# Patient Record
Sex: FEMALE | NOT HISPANIC OR LATINO | URBAN - METROPOLITAN AREA
[De-identification: names, ages, dates, MRNs, and addresses within clinical notes are randomized per-mention and may not be internally consistent; named-entity substitution may affect disease eponyms.]

---

## 2020-06-03 ENCOUNTER — TELEPHONE (OUTPATIENT)
Dept: GASTROENTEROLOGY | Facility: CLINIC | Age: 60
End: 2020-06-03

## 2020-06-03 NOTE — TELEPHONE ENCOUNTER
Veronica with instructions for covid test and for her to be there at [address] At 1:15 Sunday 6/7/2020 and for her to call back to let us know she knows.

## 2020-06-05 RX ORDER — SODIUM, POTASSIUM,MAG SULFATES 17.5-3.13G
2 SOLUTION, RECONSTITUTED, ORAL ORAL TAKE AS DIRECTED
Qty: 354 ML | Refills: 0 | Status: SHIPPED | OUTPATIENT
Start: 2020-06-05

## 2020-06-07 ENCOUNTER — APPOINTMENT (OUTPATIENT)
Dept: PREADMISSION TESTING | Facility: HOSPITAL | Age: 60
End: 2020-06-07

## 2020-06-07 PROCEDURE — U0002 COVID-19 LAB TEST NON-CDC: HCPCS

## 2020-06-07 PROCEDURE — U0004 COV-19 TEST NON-CDC HGH THRU: HCPCS

## 2020-06-07 PROCEDURE — C9803 HOPD COVID-19 SPEC COLLECT: HCPCS

## 2020-06-08 LAB
REF LAB TEST METHOD: NORMAL
SARS-COV-2 RNA RESP QL NAA+PROBE: NOT DETECTED

## 2020-06-09 ENCOUNTER — OUTSIDE FACILITY SERVICE (OUTPATIENT)
Dept: GASTROENTEROLOGY | Facility: CLINIC | Age: 60
End: 2020-06-09

## 2020-06-09 ENCOUNTER — LAB REQUISITION (OUTPATIENT)
Dept: LAB | Facility: HOSPITAL | Age: 60
End: 2020-06-09

## 2020-06-09 DIAGNOSIS — R13.10 DYSPHAGIA, UNSPECIFIED: ICD-10-CM

## 2020-06-09 DIAGNOSIS — Z12.11 ENCOUNTER FOR SCREENING FOR MALIGNANT NEOPLASM OF COLON: ICD-10-CM

## 2020-06-09 PROCEDURE — 88305 TISSUE EXAM BY PATHOLOGIST: CPT | Performed by: INTERNAL MEDICINE

## 2020-06-09 PROCEDURE — 43239 EGD BIOPSY SINGLE/MULTIPLE: CPT | Performed by: INTERNAL MEDICINE

## 2020-06-09 PROCEDURE — 99203 OFFICE O/P NEW LOW 30 MIN: CPT | Performed by: INTERNAL MEDICINE

## 2020-06-09 PROCEDURE — 45385 COLONOSCOPY W/LESION REMOVAL: CPT | Performed by: INTERNAL MEDICINE

## 2020-06-10 LAB
CYTO UR: NORMAL
LAB AP CASE REPORT: NORMAL
LAB AP CLINICAL INFORMATION: NORMAL
LAB AP DIAGNOSIS COMMENT: NORMAL
PATH REPORT.FINAL DX SPEC: NORMAL
PATH REPORT.GROSS SPEC: NORMAL

## 2023-05-11 ENCOUNTER — HOSPITAL ENCOUNTER (EMERGENCY)
Facility: HOSPITAL | Age: 63
Discharge: HOME | End: 2023-05-11
Attending: EMERGENCY MEDICINE
Payer: MEDICARE

## 2023-05-11 ENCOUNTER — APPOINTMENT (OUTPATIENT)
Dept: GENERAL RADIOLOGY | Facility: HOSPITAL | Age: 63
End: 2023-05-11
Payer: MEDICARE

## 2023-05-11 VITALS
DIASTOLIC BLOOD PRESSURE: 85 MMHG | HEART RATE: 97 BPM | OXYGEN SATURATION: 96 % | HEIGHT: 67 IN | TEMPERATURE: 98.6 F | WEIGHT: 222 LBS | SYSTOLIC BLOOD PRESSURE: 172 MMHG | BODY MASS INDEX: 34.84 KG/M2 | RESPIRATION RATE: 20 BRPM

## 2023-05-11 DIAGNOSIS — M25.512 ACUTE PAIN OF LEFT SHOULDER: Primary | ICD-10-CM

## 2023-05-11 DIAGNOSIS — R91.8 INFILTRATE OF LEFT LUNG PRESENT ON CHEST X-RAY: ICD-10-CM

## 2023-05-11 LAB
ALBUMIN SERPL-MCNC: 3.7 G/DL (ref 3.5–5.2)
ALBUMIN/GLOB SERPL: 1.2 G/DL
ALP SERPL-CCNC: 125 U/L (ref 39–117)
ALT SERPL W P-5'-P-CCNC: 17 U/L (ref 1–33)
ANION GAP SERPL CALCULATED.3IONS-SCNC: 10 MMOL/L (ref 5–15)
AST SERPL-CCNC: 20 U/L (ref 1–32)
BASOPHILS # BLD AUTO: 0.09 10*3/MM3 (ref 0–0.2)
BASOPHILS NFR BLD AUTO: 0.7 % (ref 0–1.5)
BILIRUB SERPL-MCNC: 0.3 MG/DL (ref 0–1.2)
BUN SERPL-MCNC: 18 MG/DL (ref 8–23)
BUN/CREAT SERPL: 23.7 (ref 7–25)
CALCIUM SPEC-SCNC: 9.4 MG/DL (ref 8.6–10.5)
CHLORIDE SERPL-SCNC: 101 MMOL/L (ref 98–107)
CO2 SERPL-SCNC: 27 MMOL/L (ref 22–29)
CREAT SERPL-MCNC: 0.76 MG/DL (ref 0.57–1)
DEPRECATED RDW RBC AUTO: 44.2 FL (ref 37–54)
EGFRCR SERPLBLD CKD-EPI 2021: 87.6 ML/MIN/1.73
EOSINOPHIL # BLD AUTO: 0.16 10*3/MM3 (ref 0–0.4)
EOSINOPHIL NFR BLD AUTO: 1.2 % (ref 0.3–6.2)
ERYTHROCYTE [DISTWIDTH] IN BLOOD BY AUTOMATED COUNT: 13.7 % (ref 12.3–15.4)
GLOBULIN UR ELPH-MCNC: 3.1 GM/DL
GLUCOSE SERPL-MCNC: 163 MG/DL (ref 65–99)
HCT VFR BLD AUTO: 49.5 % (ref 34–46.6)
HGB BLD-MCNC: 16.3 G/DL (ref 12–15.9)
IMM GRANULOCYTES # BLD AUTO: 0.06 10*3/MM3 (ref 0–0.05)
IMM GRANULOCYTES NFR BLD AUTO: 0.5 % (ref 0–0.5)
LYMPHOCYTES # BLD AUTO: 2.85 10*3/MM3 (ref 0.7–3.1)
LYMPHOCYTES NFR BLD AUTO: 22.1 % (ref 19.6–45.3)
MCH RBC QN AUTO: 29 PG (ref 26.6–33)
MCHC RBC AUTO-ENTMCNC: 32.9 G/DL (ref 31.5–35.7)
MCV RBC AUTO: 87.9 FL (ref 79–97)
MONOCYTES # BLD AUTO: 0.94 10*3/MM3 (ref 0.1–0.9)
MONOCYTES NFR BLD AUTO: 7.3 % (ref 5–12)
NEUTROPHILS NFR BLD AUTO: 68.2 % (ref 42.7–76)
NEUTROPHILS NFR BLD AUTO: 8.78 10*3/MM3 (ref 1.7–7)
NRBC BLD AUTO-RTO: 0 /100 WBC (ref 0–0.2)
PLATELET # BLD AUTO: 213 10*3/MM3 (ref 140–450)
PMV BLD AUTO: 9.7 FL (ref 6–12)
POTASSIUM SERPL-SCNC: 4.6 MMOL/L (ref 3.5–5.2)
PROT SERPL-MCNC: 6.8 G/DL (ref 6–8.5)
QT INTERVAL: 362 MS
QT INTERVAL: 378 MS
QTC INTERVAL: 420 MS
QTC INTERVAL: 422 MS
RBC # BLD AUTO: 5.63 10*6/MM3 (ref 3.77–5.28)
SODIUM SERPL-SCNC: 138 MMOL/L (ref 136–145)
TROPONIN T SERPL HS-MCNC: 17 NG/L
TROPONIN T SERPL HS-MCNC: 20 NG/L
WBC NRBC COR # BLD: 12.88 10*3/MM3 (ref 3.4–10.8)

## 2023-05-11 PROCEDURE — 96372 THER/PROPH/DIAG INJ SC/IM: CPT

## 2023-05-11 PROCEDURE — 85025 COMPLETE CBC W/AUTO DIFF WBC: CPT | Performed by: PHYSICIAN ASSISTANT

## 2023-05-11 PROCEDURE — 99283 EMERGENCY DEPT VISIT LOW MDM: CPT

## 2023-05-11 PROCEDURE — 72040 X-RAY EXAM NECK SPINE 2-3 VW: CPT

## 2023-05-11 PROCEDURE — 93005 ELECTROCARDIOGRAM TRACING: CPT | Performed by: PHYSICIAN ASSISTANT

## 2023-05-11 PROCEDURE — 84484 ASSAY OF TROPONIN QUANT: CPT | Performed by: PHYSICIAN ASSISTANT

## 2023-05-11 PROCEDURE — 93005 ELECTROCARDIOGRAM TRACING: CPT | Performed by: EMERGENCY MEDICINE

## 2023-05-11 PROCEDURE — 71046 X-RAY EXAM CHEST 2 VIEWS: CPT

## 2023-05-11 PROCEDURE — 25010000002 KETOROLAC TROMETHAMINE PER 15 MG: Performed by: PHYSICIAN ASSISTANT

## 2023-05-11 PROCEDURE — 84484 ASSAY OF TROPONIN QUANT: CPT | Performed by: EMERGENCY MEDICINE

## 2023-05-11 PROCEDURE — 80053 COMPREHEN METABOLIC PANEL: CPT | Performed by: PHYSICIAN ASSISTANT

## 2023-05-11 PROCEDURE — 36415 COLL VENOUS BLD VENIPUNCTURE: CPT

## 2023-05-11 RX ORDER — DOXYCYCLINE 100 MG/1
100 CAPSULE ORAL 2 TIMES DAILY
Qty: 20 CAPSULE | Refills: 0 | Status: SHIPPED | OUTPATIENT
Start: 2023-05-11

## 2023-05-11 RX ORDER — LISINOPRIL 20 MG/1
1 TABLET ORAL EVERY 12 HOURS SCHEDULED
COMMUNITY
Start: 2023-03-13

## 2023-05-11 RX ORDER — KETOROLAC TROMETHAMINE 10 MG/1
10 TABLET, FILM COATED ORAL EVERY 6 HOURS PRN
Qty: 12 TABLET | Refills: 0 | Status: SHIPPED | OUTPATIENT
Start: 2023-05-11

## 2023-05-11 RX ORDER — KETOROLAC TROMETHAMINE 30 MG/ML
60 INJECTION, SOLUTION INTRAMUSCULAR; INTRAVENOUS ONCE
Status: COMPLETED | OUTPATIENT
Start: 2023-05-11 | End: 2023-05-11

## 2023-05-11 RX ORDER — DICLOFENAC SODIUM 75 MG/1
1 TABLET, DELAYED RELEASE ORAL EVERY 12 HOURS SCHEDULED
COMMUNITY
Start: 2023-04-13 | End: 2023-05-11

## 2023-05-11 RX ORDER — LEVOTHYROXINE SODIUM 0.03 MG/1
TABLET ORAL
COMMUNITY
Start: 2023-05-08

## 2023-05-11 RX ORDER — MONTELUKAST SODIUM 10 MG/1
1 TABLET ORAL
COMMUNITY
Start: 2014-07-29

## 2023-05-11 RX ADMIN — KETOROLAC TROMETHAMINE 60 MG: 30 INJECTION, SOLUTION INTRAMUSCULAR; INTRAVENOUS at 16:57

## 2023-05-11 NOTE — Clinical Note
[facility] EMERGENCY DEPARTMENT  [address] [facility] [address]  Phone: [phone]    Cori Moya was seen and treated in our emergency department on 5/11/2023.  She may return to work on 05/15/2023.         Thank you for choosing [facility].    Shorty Gonzalez PA

## 2023-05-11 NOTE — ED PROVIDER NOTES
Subjective   History of Present Illness  63-year-old female presents emergency department today with left shoulder pain.  She has had no fall no trauma.  Pain is worse with movement states that tried to push up and she increases her pain.  She also had some pain in her neck with extension of her neck.  She reports she had a little bit of pain in the anterior chest but has no pain at the moment.  She had no shortness of breath she has had a cough and has had upper respiratory symptoms.  She reports that she does smoke about a pack of cigarettes a day.  She has a history of diabetes type 2, history of hypertension.  She states she does not have hyperlipidemia she has had no known history of cardiac disease had no testing.  She had no fall no trauma denies any numbness or ting of the upper extremity.    History provided by:  Patient   used: No    Chest Pain  Chest pain location: Left posterior shoulder.  Pain quality: aching and dull    Pain radiates to:  L arm  Pain severity:  Severe  Onset quality:  Gradual  Timing:  Constant  Progression:  Waxing and waning  Chronicity:  New  Context: movement and raising an arm    Context: not breathing, not eating, not lifting, not at rest, not stress and not trauma    Relieved by:  Rest  Worsened by:  Movement and certain positions  Associated symptoms: cough    Associated symptoms: no abdominal pain, no anorexia, no anxiety, no back pain, no claudication, no diaphoresis, no dysphagia, no fever, no heartburn, no nausea, no orthopnea, no shortness of breath, no vomiting and no weakness    Risk factors: diabetes mellitus, hypertension, male sex and smoking    Risk factors: no birth control and no coronary artery disease        Review of Systems   Constitutional: Negative for diaphoresis and fever.   HENT: Negative for trouble swallowing.    Respiratory: Positive for cough. Negative for chest tightness and shortness of breath.    Cardiovascular: Positive for  chest pain. Negative for orthopnea and claudication.   Gastrointestinal: Negative for abdominal pain, anorexia, heartburn, nausea and vomiting.   Musculoskeletal: Negative for back pain.   Neurological: Negative for weakness.   All other systems reviewed and are negative.      Past Medical History:   Diagnosis Date   • Idiopathic intracranial hypertension        Allergies   Allergen Reactions   • Aspirin Other (See Comments)     Knots on legs       Past Surgical History:   Procedure Laterality Date   • CARPAL TUNNEL RELEASE     •  SECTION     • DENTAL PROCEDURE     • OPTIC NERVE SHEATH FENESTRATIAN     • POSTERIOR FOSSA DECOMPRESSION     • TONSILLECTOMY     • TUBAL ABDOMINAL LIGATION         History reviewed. No pertinent family history.    Social History     Socioeconomic History   • Marital status: Single   Tobacco Use   • Smoking status: Every Day     Types: Cigarettes   • Smokeless tobacco: Never   Vaping Use   • Vaping Use: Never used   Substance and Sexual Activity   • Alcohol use: Never   • Drug use: Defer   • Sexual activity: Defer           Objective   Physical Exam  Vitals and nursing note reviewed.   Constitutional:       Appearance: She is well-developed.   HENT:      Head: Normocephalic and atraumatic.      Right Ear: External ear normal.      Left Ear: External ear normal.      Nose: Nose normal.   Eyes:      General: No scleral icterus.     Conjunctiva/sclera: Conjunctivae normal.      Pupils: Pupils are equal, round, and reactive to light.   Neck:      Thyroid: No thyromegaly.   Cardiovascular:      Rate and Rhythm: Normal rate and regular rhythm.      Heart sounds: Normal heart sounds.   Pulmonary:      Effort: Pulmonary effort is normal. No respiratory distress.      Breath sounds: Normal breath sounds. No wheezing or rales.   Chest:      Chest wall: No tenderness.   Abdominal:      General: Bowel sounds are normal. There is no distension.      Palpations: Abdomen is soft.      Tenderness:  There is no abdominal tenderness.   Musculoskeletal:         General: Normal range of motion.      Cervical back: Normal range of motion.      Comments: Diffuse tenderness in the left trapezius and posterior shoulder.  There is no rash no lesions pain is exacerbated with extension of the neck and abduction of the arm distal pulses are strong and equal sensations intact.   Lymphadenopathy:      Cervical: No cervical adenopathy.   Skin:     General: Skin is warm and dry.   Neurological:      Mental Status: She is alert and oriented to person, place, and time.      Cranial Nerves: No cranial nerve deficit.      Coordination: Coordination normal.      Deep Tendon Reflexes: Reflexes are normal and symmetric. Reflexes normal.   Psychiatric:         Behavior: Behavior normal.         Thought Content: Thought content normal.         Judgment: Judgment normal.         Procedures           ED Course  ED Course as of 05/15/23 1958   Thu May 11, 2023   1858 HS Troponin T(!): 20 [MATTI]      ED Course User Index  [MATTI] Shotry Gonzalez PA           No results found for this or any previous visit (from the past 24 hour(s)).  Note: In addition to lab results from this visit, the labs listed above may include labs taken at another facility or during a different encounter within the last 24 hours. Please correlate lab times with ED admission and discharge times for further clarification of the services performed during this visit.    XR Spine Cervical 2 or 3 View   Final Result   Impression:   1.No acute osseous process identified.   2.Multilevel degenerative changes as described above.         Electronically Signed: Robert Orourke     5/11/2023 4:49 PM EDT     Workstation ID: [id number]      XR Chest PA & Lateral   Final Result   Impression:   Mild left basilar opacity, which could reflect atelectasis or pneumonia.         Electronically Signed: Robert Orourke     5/11/2023 4:46 PM EDT     Workstation ID: [id number]        Vitals:  "   05/11/23 1540   BP: 172/85   BP Location: Left arm   Patient Position: Sitting   Pulse: 97   Resp: 20   Temp: 98.6 °F (37 °C)   TempSrc: Oral   SpO2: 96%   Weight: 101 kg (222 lb)   Height: 170.2 cm (67\")     Medications   ketorolac (TORADOL) injection 60 mg (60 mg Intramuscular Given 5/11/23 1657)     ECG/EMG Results (last 24 hours)     Procedure Component Value Units Date/Time    ECG 12 Lead Chest Pain [[id number]] Collected: 05/11/23 1658     Updated: 05/11/23 1659     QT Interval 362 ms      QTC Interval 420 ms     Narrative:      Test Reason : Chest Pain  Blood Pressure :   */*   mmHG  Vent. Rate :  81 BPM     Atrial Rate :  81 BPM     P-R Int : 158 ms          QRS Dur :  90 ms      QT Int : 362 ms       P-R-T Axes :  64 -19  62 degrees     QTc Int : 420 ms    Normal sinus rhythm  Possible Left atrial enlargement  Cannot rule out Anterior infarct , age undetermined  Abnormal ECG  When compared with ECG of 18-JAN-2013 08:41,  No significant change was found    Referred By: EDMD           Confirmed By:     ECG 12 Lead Other; 2nd set [[id number]] Collected: 05/11/23 1821     Updated: 05/11/23 1822     QT Interval 378 ms      QTC Interval 422 ms     Narrative:      Test Reason : Other~  Blood Pressure :   */*   mmHG  Vent. Rate :  75 BPM     Atrial Rate :  75 BPM     P-R Int : 154 ms          QRS Dur :  84 ms      QT Int : 378 ms       P-R-T Axes :  66 -12  54 degrees     QTc Int : 422 ms    Normal sinus rhythm  Low voltage QRS  Cannot rule out Anterior infarct (cited on or before 11-MAY-2023)  Abnormal ECG  When compared with ECG of 11-MAY-2023 16:58, (Unconfirmed)  No significant change was found    Referred By: EDMD           Confirmed By:         ECG 12 Lead Other; 2nd set   Final Result   Test Reason : Other~   Blood Pressure :   */*   mmHG   Vent. Rate :  75 BPM     Atrial Rate :  75 BPM      P-R Int : 154 ms          QRS Dur :  84 ms       QT Int : 378 ms       P-R-T Axes :  66 -12  54 degrees      QTc Int " : 422 ms      Normal sinus rhythm   Low voltage QRS   Cannot rule out Anterior infarct (cited on or before 11-MAY-2023)   Abnormal ECG   When compared with ECG of 11-MAY-2023 16:58, (Unconfirmed)   No significant change was found   Confirmed by SABAS LUCIO MD ([id number]) on 5/11/2023 7:44:48 PM      Referred By: JERSON           Confirmed By: SABAS LUCIO MD      ECG 12 Lead Chest Pain   Final Result   Test Reason : Chest Pain   Blood Pressure :   */*   mmHG   Vent. Rate :  81 BPM     Atrial Rate :  81 BPM      P-R Int : 158 ms          QRS Dur :  90 ms       QT Int : 362 ms       P-R-T Axes :  64 -19  62 degrees      QTc Int : 420 ms      Normal sinus rhythm   Possible Left atrial enlargement   Cannot rule out Anterior infarct , age undetermined   Abnormal ECG   When compared with ECG of 18-JAN-2013 08:41,   No significant change was found   Confirmed by SABAS LUCIO MD ([id number]) on 5/11/2023 7:44:44 PM      Referred By: EDMD           Confirmed By: SABAS LUCIO MD                                            Medical Decision Making  Pain in the left posterior shoulder does seem very musculoskeletal however she has multiple risk factors for cardiac disease and the fact she smokes, hypertension, diabetes and age 1 sets of enzymes that delta is less than 4 EKGs are unremarkable.  She had significant relief of the pain with IM Toradol she will be discharged home with some oral Toradol to follow-up with her primary care doctor.  I felt this is mostly musculoskeletal despite her risk factors.  Heart score is noted on the chart.    Acute pain of left shoulder: acute illness or injury  Infiltrate of left lung present on chest x-ray: acute illness or injury  Amount and/or Complexity of Data Reviewed  Labs: ordered. Decision-making details documented in ED Course.  Radiology: ordered and independent interpretation performed. Decision-making details documented in ED Course.  ECG/medicine tests: ordered and independent  interpretation performed. Decision-making details documented in ED Course.      Risk  Prescription drug management.          Final diagnoses:   Acute pain of left shoulder   Infiltrate of left lung present on chest x-ray       ED Disposition  ED Disposition     ED Disposition   Discharge    Condition   Stable    Comment   --             Pepper Delarosa, APRN  [address] [id number] [phone]               Medication List      New Prescriptions    doxycycline 100 MG capsule  Commonly known as: MONODOX  Take 1 capsule by mouth 2 (Two) Times a Day.     ketorolac 10 MG tablet  Commonly known as: TORADOL  Take 1 tablet by mouth Every 6 (Six) Hours As Needed for Moderate Pain.        Stop    diclofenac 75 MG EC tablet  Commonly known as: VOLTAREN           Where to Get Your Medications      These medications were sent to [facility]/pharmacy #[id number] - [place], [place] - [address] [place] [address] AT [facility] - [phone]  - [phone] [address] [address]    Phone: [phone]   · doxycycline 100 MG capsule  · ketorolac 10 MG tablet          Shorty Gonzalez PA  05/15/23 1958

## 2024-06-30 ENCOUNTER — APPOINTMENT (OUTPATIENT)
Dept: GENERAL RADIOLOGY | Facility: HOSPITAL | Age: 64
End: 2024-06-30
Payer: MEDICARE

## 2024-06-30 ENCOUNTER — HOSPITAL ENCOUNTER (EMERGENCY)
Facility: HOSPITAL | Age: 64
Discharge: HOME | End: 2024-06-30
Attending: EMERGENCY MEDICINE | Admitting: EMERGENCY MEDICINE
Payer: MEDICARE

## 2024-06-30 VITALS
HEIGHT: 67 IN | OXYGEN SATURATION: 93 % | WEIGHT: 220 LBS | DIASTOLIC BLOOD PRESSURE: 69 MMHG | SYSTOLIC BLOOD PRESSURE: 99 MMHG | TEMPERATURE: 97.7 F | BODY MASS INDEX: 34.53 KG/M2 | RESPIRATION RATE: 20 BRPM | HEART RATE: 72 BPM

## 2024-06-30 DIAGNOSIS — R07.81 PLEURITIC CHEST PAIN: Primary | ICD-10-CM

## 2024-06-30 DIAGNOSIS — J98.4 PNEUMONITIS: ICD-10-CM

## 2024-06-30 DIAGNOSIS — J45.901 ACUTE EXACERBATION OF COPD WITH ASTHMA: ICD-10-CM

## 2024-06-30 DIAGNOSIS — J44.1 ACUTE EXACERBATION OF COPD WITH ASTHMA: ICD-10-CM

## 2024-06-30 LAB
ALBUMIN SERPL-MCNC: 3.8 G/DL (ref 3.5–5.2)
ALBUMIN/GLOB SERPL: 1.3 G/DL
ALP SERPL-CCNC: 149 U/L (ref 39–117)
ALT SERPL W P-5'-P-CCNC: 17 U/L (ref 1–33)
ANION GAP SERPL CALCULATED.3IONS-SCNC: 10 MMOL/L (ref 5–15)
AST SERPL-CCNC: 22 U/L (ref 1–32)
BASOPHILS # BLD MANUAL: 0.13 10*3/MM3 (ref 0–0.2)
BASOPHILS NFR BLD MANUAL: 1 % (ref 0–1.5)
BILIRUB SERPL-MCNC: 0.6 MG/DL (ref 0–1.2)
BUN SERPL-MCNC: 17 MG/DL (ref 8–23)
BUN/CREAT SERPL: 17.7 (ref 7–25)
CALCIUM SPEC-SCNC: 9.3 MG/DL (ref 8.6–10.5)
CHLORIDE SERPL-SCNC: 101 MMOL/L (ref 98–107)
CO2 SERPL-SCNC: 26 MMOL/L (ref 22–29)
CREAT SERPL-MCNC: 0.96 MG/DL (ref 0.57–1)
D DIMER PPP FEU-MCNC: 0.29 MCGFEU/ML (ref 0–0.65)
DEPRECATED RDW RBC AUTO: 44.4 FL (ref 37–54)
EGFRCR SERPLBLD CKD-EPI 2021: 65.8 ML/MIN/1.73
EOSINOPHIL # BLD MANUAL: 0.26 10*3/MM3 (ref 0–0.4)
EOSINOPHIL NFR BLD MANUAL: 2 % (ref 0.3–6.2)
ERYTHROCYTE [DISTWIDTH] IN BLOOD BY AUTOMATED COUNT: 13.8 % (ref 12.3–15.4)
GLOBULIN UR ELPH-MCNC: 2.9 GM/DL
GLUCOSE SERPL-MCNC: 110 MG/DL (ref 65–99)
HCT VFR BLD AUTO: 52.4 % (ref 34–46.6)
HGB BLD-MCNC: 17.5 G/DL (ref 12–15.9)
HOLD SPECIMEN: NORMAL
LYMPHOCYTES # BLD MANUAL: 3.64 10*3/MM3 (ref 0.7–3.1)
LYMPHOCYTES NFR BLD MANUAL: 6 % (ref 5–12)
MCH RBC QN AUTO: 29.3 PG (ref 26.6–33)
MCHC RBC AUTO-ENTMCNC: 33.4 G/DL (ref 31.5–35.7)
MCV RBC AUTO: 87.8 FL (ref 79–97)
MONOCYTES # BLD: 0.78 10*3/MM3 (ref 0.1–0.9)
NEUTROPHILS # BLD AUTO: 8.18 10*3/MM3 (ref 1.7–7)
NEUTROPHILS NFR BLD MANUAL: 63 % (ref 42.7–76)
NT-PROBNP SERPL-MCNC: 97 PG/ML (ref 0–900)
PLATELET # BLD AUTO: 221 10*3/MM3 (ref 140–450)
PMV BLD AUTO: 10.3 FL (ref 6–12)
POTASSIUM SERPL-SCNC: 4.4 MMOL/L (ref 3.5–5.2)
PROT SERPL-MCNC: 6.7 G/DL (ref 6–8.5)
RBC # BLD AUTO: 5.97 10*6/MM3 (ref 3.77–5.28)
RBC MORPH BLD: NORMAL
SMALL PLATELETS BLD QL SMEAR: ADEQUATE
SODIUM SERPL-SCNC: 137 MMOL/L (ref 136–145)
TROPONIN T SERPL HS-MCNC: 23 NG/L
TROPONIN T SERPL HS-MCNC: 23 NG/L
VARIANT LYMPHS NFR BLD MANUAL: 21 % (ref 0–5)
VARIANT LYMPHS NFR BLD MANUAL: 7 % (ref 19.6–45.3)
WBC MORPH BLD: NORMAL
WBC NRBC COR # BLD AUTO: 12.99 10*3/MM3 (ref 3.4–10.8)
WHOLE BLOOD HOLD COAG: NORMAL
WHOLE BLOOD HOLD SPECIMEN: NORMAL

## 2024-06-30 PROCEDURE — 93005 ELECTROCARDIOGRAM TRACING: CPT | Performed by: EMERGENCY MEDICINE

## 2024-06-30 PROCEDURE — 25010000002 KETOROLAC TROMETHAMINE PER 15 MG: Performed by: PHYSICIAN ASSISTANT

## 2024-06-30 PROCEDURE — 85379 FIBRIN DEGRADATION QUANT: CPT | Performed by: PHYSICIAN ASSISTANT

## 2024-06-30 PROCEDURE — 80053 COMPREHEN METABOLIC PANEL: CPT | Performed by: EMERGENCY MEDICINE

## 2024-06-30 PROCEDURE — 71045 X-RAY EXAM CHEST 1 VIEW: CPT

## 2024-06-30 PROCEDURE — 83880 ASSAY OF NATRIURETIC PEPTIDE: CPT | Performed by: EMERGENCY MEDICINE

## 2024-06-30 PROCEDURE — 99284 EMERGENCY DEPT VISIT MOD MDM: CPT

## 2024-06-30 PROCEDURE — 93005 ELECTROCARDIOGRAM TRACING: CPT

## 2024-06-30 PROCEDURE — 84484 ASSAY OF TROPONIN QUANT: CPT | Performed by: EMERGENCY MEDICINE

## 2024-06-30 PROCEDURE — 93005 ELECTROCARDIOGRAM TRACING: CPT | Performed by: PHYSICIAN ASSISTANT

## 2024-06-30 PROCEDURE — 84484 ASSAY OF TROPONIN QUANT: CPT | Performed by: PHYSICIAN ASSISTANT

## 2024-06-30 PROCEDURE — 85025 COMPLETE CBC W/AUTO DIFF WBC: CPT | Performed by: EMERGENCY MEDICINE

## 2024-06-30 PROCEDURE — 36415 COLL VENOUS BLD VENIPUNCTURE: CPT

## 2024-06-30 PROCEDURE — 96374 THER/PROPH/DIAG INJ IV PUSH: CPT

## 2024-06-30 PROCEDURE — 85007 BL SMEAR W/DIFF WBC COUNT: CPT | Performed by: EMERGENCY MEDICINE

## 2024-06-30 RX ORDER — KETOROLAC TROMETHAMINE 10 MG/1
10 TABLET, FILM COATED ORAL EVERY 6 HOURS PRN
Qty: 12 TABLET | Refills: 0 | Status: SHIPPED | OUTPATIENT
Start: 2024-06-30

## 2024-06-30 RX ORDER — KETOROLAC TROMETHAMINE 30 MG/ML
15 INJECTION, SOLUTION INTRAMUSCULAR; INTRAVENOUS ONCE
Status: COMPLETED | OUTPATIENT
Start: 2024-06-30 | End: 2024-06-30

## 2024-06-30 RX ORDER — SODIUM CHLORIDE 0.9 % (FLUSH) 0.9 %
10 SYRINGE (ML) INJECTION AS NEEDED
Status: DISCONTINUED | OUTPATIENT
Start: 2024-06-30 | End: 2024-06-30 | Stop reason: HOSPADM

## 2024-06-30 RX ORDER — DOXYCYCLINE 100 MG/1
100 CAPSULE ORAL 2 TIMES DAILY
Qty: 20 CAPSULE | Refills: 0 | Status: SHIPPED | OUTPATIENT
Start: 2024-06-30

## 2024-06-30 RX ADMIN — KETOROLAC TROMETHAMINE 15 MG: 30 INJECTION, SOLUTION INTRAMUSCULAR; INTRAVENOUS at 15:06

## 2024-06-30 NOTE — ED PROVIDER NOTES
Subjective   History of Present Illness  64-year-old female presents emergency department today with cough congestion and concern for having pneumonia.  She reports she has some pain in her left shoulder when she takes a deep breath.  She had similar symptoms last year and ended up having pneumonia.  She reports that she has had no fevers no chills cough been productive with copious amounts of yellowish-green sputum.  She is a smoker of a pack of cigarettes a day and has a history of COPD.  Has had some increased wheezing.  She said she has had no fevers no known history of cardiac disease.  She has had no nausea no vomiting.  Denies melena hematochezia hematemesis.  She continues to be a pack-a-day smoker.    History provided by:  Patient   used: No    Pain With Breathing  Location:  Left shoulder with deep inspiration  Quality:  Sharp stabbing  Severity:  Moderate  Onset quality:  Sudden  Duration:  3 days  Timing:  Intermittent  Progression:  Waxing and waning  Chronicity:  New  Context:  Similar to pneumonia last year sharp pleuritic pain with deep inspiration  Relieved by:  Shallow breathing  Worsened by:  Coughing deep inspiration  Associated symptoms: no abdominal pain, no chest pain, no congestion, no diarrhea, no fever, no loss of consciousness, no myalgias, no rhinorrhea, no vomiting and no wheezing        Review of Systems   Constitutional:  Negative for fever.   HENT:  Negative for congestion and rhinorrhea.    Respiratory:  Negative for chest tightness and wheezing.    Cardiovascular:  Negative for chest pain.   Gastrointestinal:  Negative for abdominal pain, diarrhea and vomiting.   Musculoskeletal:  Negative for myalgias.   Neurological:  Negative for loss of consciousness.       Past Medical History:   Diagnosis Date    Idiopathic intracranial hypertension        Allergies   Allergen Reactions    Aspirin Other (See Comments)     Knots on legs  Tolerates ketorolac       Past  Surgical History:   Procedure Laterality Date    CARPAL TUNNEL RELEASE       SECTION      DENTAL PROCEDURE      OPTIC NERVE SHEATH FENESTRATIAN      POSTERIOR FOSSA DECOMPRESSION      TONSILLECTOMY      TUBAL ABDOMINAL LIGATION         No family history on file.    Social History     Socioeconomic History    Marital status: Single   Tobacco Use    Smoking status: Every Day     Types: Cigarettes    Smokeless tobacco: Never   Vaping Use    Vaping status: Never Used   Substance and Sexual Activity    Alcohol use: Never    Drug use: Defer    Sexual activity: Defer           Objective   Physical Exam  Vitals and nursing note reviewed.   Constitutional:       General: She is not in acute distress.     Appearance: She is well-developed. She is not diaphoretic.   HENT:      Head: Normocephalic and atraumatic.      Nose: Nose normal.   Eyes:      General: No scleral icterus.     Conjunctiva/sclera: Conjunctivae normal.   Cardiovascular:      Rate and Rhythm: Normal rate and regular rhythm.      Heart sounds: Normal heart sounds. No murmur heard.  Pulmonary:      Effort: Pulmonary effort is normal. No respiratory distress.      Breath sounds: Normal breath sounds.   Abdominal:      General: Bowel sounds are normal.      Palpations: Abdomen is soft.      Tenderness: There is no abdominal tenderness.   Musculoskeletal:         General: Normal range of motion.      Cervical back: Normal range of motion and neck supple.   Skin:     General: Skin is warm and dry.   Neurological:      Mental Status: She is alert and oriented to person, place, and time.   Psychiatric:         Behavior: Behavior normal.         Procedures           ED Course  ED Course as of 24 1739   Sun 2024   1534 RDW-SD: 44.4 [MATTI]   1711 Laboratory data white count of 13 with an H&H of 17.5 and 52.  Platelet count of 221.  CMP had a glucose of 110 BUN of 70 creatinine 0.97.  Sodium 137 and a potassium of 4.4.  Patient's ALT 17 AST 22 alk  phos 149 total bilirubin 0.6.  Patient's first troponin was at 23-second is pending D-dimer was 0.29 which is within normal limits.  proBNP 97. [MATTI]   1712 During revealed no acute findings no infiltrate or effusion. [MATTI]      ED Course User Index  [MATTI] Shorty Gonzalez PA                                 Recent Results (from the past 24 hour(s))   ECG 12 Lead ED Triage Standing Order; SOA    Collection Time: 06/30/24  2:32 PM   Result Value Ref Range    QT Interval 390 ms    QTC Interval 441 ms   Comprehensive Metabolic Panel    Collection Time: 06/30/24  2:46 PM    Specimen: Blood   Result Value Ref Range    Glucose 110 (H) 65 - 99 mg/dL    BUN 17 8 - 23 mg/dL    Creatinine 0.96 0.57 - 1.00 mg/dL    Sodium 137 136 - 145 mmol/L    Potassium 4.4 3.5 - 5.2 mmol/L    Chloride 101 98 - 107 mmol/L    CO2 26.0 22.0 - 29.0 mmol/L    Calcium 9.3 8.6 - 10.5 mg/dL    Total Protein 6.7 6.0 - 8.5 g/dL    Albumin 3.8 3.5 - 5.2 g/dL    ALT (SGPT) 17 1 - 33 U/L    AST (SGOT) 22 1 - 32 U/L    Alkaline Phosphatase 149 (H) 39 - 117 U/L    Total Bilirubin 0.6 0.0 - 1.2 mg/dL    Globulin 2.9 gm/dL    A/G Ratio 1.3 g/dL    BUN/Creatinine Ratio 17.7 7.0 - 25.0    Anion Gap 10.0 5.0 - 15.0 mmol/L    eGFR 65.8 >60.0 mL/min/1.73   BNP    Collection Time: 06/30/24  2:46 PM    Specimen: Blood   Result Value Ref Range    proBNP 97.0 0.0 - 900.0 pg/mL   Single High Sensitivity Troponin T    Collection Time: 06/30/24  2:46 PM    Specimen: Blood   Result Value Ref Range    HS Troponin T 23 (H) <14 ng/L   Green Top (Gel)    Collection Time: 06/30/24  2:46 PM   Result Value Ref Range    Extra Tube Hold for add-ons.    Lavender Top    Collection Time: 06/30/24  2:46 PM   Result Value Ref Range    Extra Tube hold for add-on    Gold Top - SST    Collection Time: 06/30/24  2:46 PM   Result Value Ref Range    Extra Tube Hold for add-ons.    Gray Top    Collection Time: 06/30/24  2:46 PM   Result Value Ref Range    Extra Tube Hold for add-ons.     Light Blue Top    Collection Time: 06/30/24  2:46 PM   Result Value Ref Range    Extra Tube Hold for add-ons.    CBC Auto Differential    Collection Time: 06/30/24  2:46 PM    Specimen: Blood   Result Value Ref Range    WBC 12.99 (H) 3.40 - 10.80 10*3/mm3    RBC 5.97 (H) 3.77 - 5.28 10*6/mm3    Hemoglobin 17.5 (H) 12.0 - 15.9 g/dL    Hematocrit 52.4 (H) 34.0 - 46.6 %    MCV 87.8 79.0 - 97.0 fL    MCH 29.3 26.6 - 33.0 pg    MCHC 33.4 31.5 - 35.7 g/dL    RDW 13.8 12.3 - 15.4 %    RDW-SD 44.4 37.0 - 54.0 fl    MPV 10.3 6.0 - 12.0 fL    Platelets 221 140 - 450 10*3/mm3   D-dimer, Quantitative    Collection Time: 06/30/24  2:46 PM    Specimen: Blood   Result Value Ref Range    D-Dimer, Quantitative 0.29 0.00 - 0.65 MCGFEU/mL   Manual Differential    Collection Time: 06/30/24  2:46 PM    Specimen: Blood   Result Value Ref Range    Neutrophil % 63.0 42.7 - 76.0 %    Lymphocyte % 7.0 (L) 19.6 - 45.3 %    Monocyte % 6.0 5.0 - 12.0 %    Eosinophil % 2.0 0.3 - 6.2 %    Basophil % 1.0 0.0 - 1.5 %    Atypical Lymphocyte % 21.0 (H) 0.0 - 5.0 %    Neutrophils Absolute 8.18 (H) 1.70 - 7.00 10*3/mm3    Lymphocytes Absolute 3.64 (H) 0.70 - 3.10 10*3/mm3    Monocytes Absolute 0.78 0.10 - 0.90 10*3/mm3    Eosinophils Absolute 0.26 0.00 - 0.40 10*3/mm3    Basophils Absolute 0.13 0.00 - 0.20 10*3/mm3    RBC Morphology Normal Normal    WBC Morphology Normal Normal    Platelet Estimate Adequate Normal   ECG 12 Lead Chest Pain    Collection Time: 06/30/24  4:57 PM   Result Value Ref Range    QT Interval 412 ms    QTC Interval 441 ms   Single High Sensitivity Troponin T    Collection Time: 06/30/24  5:03 PM    Specimen: Blood   Result Value Ref Range    HS Troponin T 23 (H) <14 ng/L     Note: In addition to lab results from this visit, the labs listed above may include labs taken at another facility or during a different encounter within the last 24 hours. Please correlate lab times with ED admission and discharge times for further  clarification of the services performed during this visit.    XR Chest 1 View   Final Result   Impression: No acute cardiopulmonary disease.         Electronically Signed: Júnior Ortiz MD     6/30/2024 3:04 PM EDT     Workstation ID: [id number]        Vitals:    06/30/24 1531 06/30/24 1601 06/30/24 1631 06/30/24 1701   BP: 100/59 105/59 105/61 99/69   BP Location:       Patient Position:       Pulse: 76 71 71 72   Resp:       Temp:       TempSrc:       SpO2: 93% 93% 92% 93%   Weight:       Height:         Medications   sodium chloride 0.9 % flush 10 mL (has no administration in time range)   ketorolac (TORADOL) injection 15 mg (15 mg Intravenous Given 6/30/24 1506)     ECG/EMG Results (last 24 hours)       Procedure Component Value Units Date/Time    ECG 12 Lead ED Triage Standing Order; SOA [[id number]] Collected: 06/30/24 1432     Updated: 06/30/24 1432     QT Interval 390 ms      QTC Interval 441 ms     Narrative:      Test Reason : ED Triage Standing Order~  Blood Pressure :   */*   mmHG  Vent. Rate :  77 BPM     Atrial Rate :  77 BPM     P-R Int : 144 ms          QRS Dur :  84 ms      QT Int : 390 ms       P-R-T Axes :  64 -13  64 degrees     QTc Int : 441 ms    Normal sinus rhythm  Normal ECG  When compared with ECG of 11-MAY-2023 18:21,  No significant change was found    Referred By: ED MD           Confirmed By:     ECG 12 Lead Chest Pain [[id number]] Collected: 06/30/24 1657     Updated: 06/30/24 1657     QT Interval 412 ms      QTC Interval 441 ms     Narrative:      Test Reason : Chest Pain  Blood Pressure :   */*   mmHG  Vent. Rate :  69 BPM     Atrial Rate :  69 BPM     P-R Int : 158 ms          QRS Dur :  94 ms      QT Int : 412 ms       P-R-T Axes :  51 -22  47 degrees     QTc Int : 441 ms    Normal sinus rhythm  Normal ECG  When compared with ECG of 30-JUN-2024 14:32, (Unconfirmed)  No significant change was found    Referred By: edmd           Confirmed By:           ECG 12 Lead Chest Pain    Preliminary Result   Test Reason : Chest Pain   Blood Pressure :   */*   mmHG   Vent. Rate :  69 BPM     Atrial Rate :  69 BPM      P-R Int : 158 ms          QRS Dur :  94 ms       QT Int : 412 ms       P-R-T Axes :  51 -22  47 degrees      QTc Int : 441 ms      Normal sinus rhythm   Normal ECG   When compared with ECG of 30-JUN-2024 14:32, (Unconfirmed)   No significant change was found      Referred By: edmd           Confirmed By:       ECG 12 Lead ED Triage Standing Order; SOA   Preliminary Result   Test Reason : ED Triage Standing Order~   Blood Pressure :   */*   mmHG   Vent. Rate :  77 BPM     Atrial Rate :  77 BPM      P-R Int : 144 ms          QRS Dur :  84 ms       QT Int : 390 ms       P-R-T Axes :  64 -13  64 degrees      QTc Int : 441 ms      Normal sinus rhythm   Normal ECG   When compared with ECG of 11-MAY-2023 18:21,   No significant change was found      Referred By: ED MD           Confirmed By:                       Medical Decision Making  Amount and/or Complexity of Data Reviewed  Labs: ordered. Decision-making details documented in ED Course.  Radiology: ordered. Decision-making details documented in ED Course.  ECG/medicine tests: ordered. Decision-making details documented in ED Course.    Risk  Prescription drug management.        Final diagnoses:   Pleuritic chest pain   Acute exacerbation of COPD with asthma   Pneumonitis       ED Disposition  ED Disposition       ED Disposition   Discharge    Condition   Stable    Comment   --               Pepper Delarosa, APRN  [address] [facility] [address] [phone]               Where to Get Your Medications        These medications were sent to [facility] - [place], [place] - [address] [facility] - [phone]  - [phone]   [address] [facility] [address]      Phone: [phone]   doxycycline 100 MG capsule  ketorolac 10 MG tablet          Medication List      No changes were made to your  prescriptions during this visit.            Shorty Gonzalez PA  06/30/24 5075

## 2024-07-01 LAB
QT INTERVAL: 390 MS
QT INTERVAL: 412 MS
QTC INTERVAL: 441 MS
QTC INTERVAL: 441 MS

## 2025-07-05 ENCOUNTER — APPOINTMENT (OUTPATIENT)
Dept: CARDIOLOGY | Facility: HOSPITAL | Age: 65
DRG: 322 | End: 2025-07-05
Payer: MEDICARE

## 2025-07-05 ENCOUNTER — HOSPITAL ENCOUNTER (INPATIENT)
Facility: HOSPITAL | Age: 65
LOS: 2 days | Discharge: HOME | DRG: 322 | End: 2025-07-07
Attending: EMERGENCY MEDICINE | Admitting: INTERNAL MEDICINE
Payer: MEDICARE

## 2025-07-05 ENCOUNTER — APPOINTMENT (OUTPATIENT)
Dept: GENERAL RADIOLOGY | Facility: HOSPITAL | Age: 65
DRG: 322 | End: 2025-07-05
Payer: MEDICARE

## 2025-07-05 DIAGNOSIS — F17.200 TOBACCO USE DISORDER: ICD-10-CM

## 2025-07-05 DIAGNOSIS — E11.65 HYPERGLYCEMIA DUE TO DIABETES MELLITUS: ICD-10-CM

## 2025-07-05 DIAGNOSIS — I21.3 ST ELEVATION MYOCARDIAL INFARCTION (STEMI), UNSPECIFIED ARTERY: Primary | ICD-10-CM

## 2025-07-05 PROBLEM — Z72.0 TOBACCO ABUSE: Status: ACTIVE | Noted: 2025-07-05

## 2025-07-05 PROBLEM — I24.9 ACS (ACUTE CORONARY SYNDROME): Status: ACTIVE | Noted: 2025-07-05

## 2025-07-05 PROBLEM — I10 ESSENTIAL HYPERTENSION: Status: ACTIVE | Noted: 2025-07-05

## 2025-07-05 LAB
ALBUMIN SERPL-MCNC: 4.1 G/DL (ref 3.5–5.2)
ALBUMIN/GLOB SERPL: 1.9 G/DL
ALP SERPL-CCNC: 151 U/L (ref 39–117)
ALT SERPL W P-5'-P-CCNC: 21 U/L (ref 1–33)
ANION GAP SERPL CALCULATED.3IONS-SCNC: 10 MMOL/L (ref 5–15)
ANION GAP SERPL CALCULATED.3IONS-SCNC: 10 MMOL/L (ref 5–15)
AORTIC DIMENSIONLESS INDEX: 0.89 (DI)
AST SERPL-CCNC: 19 U/L (ref 1–32)
AV MEAN PRESS GRAD SYS DOP V1V2: 4.5 MMHG
AV VMAX SYS DOP: 139 CM/SEC
BASOPHILS # BLD AUTO: 0.07 10*3/MM3 (ref 0–0.2)
BASOPHILS NFR BLD AUTO: 0.6 % (ref 0–1.5)
BH CV ECHO MEAS - AO MAX PG: 7.7 MMHG
BH CV ECHO MEAS - AO ROOT DIAM: 3.2 CM
BH CV ECHO MEAS - AO V2 VTI: 30.5 CM
BH CV ECHO MEAS - AVA(I,D): 2.8 CM2
BH CV ECHO MEAS - EDV(CUBED): 97.3 ML
BH CV ECHO MEAS - EDV(MOD-SP2): 65.3 ML
BH CV ECHO MEAS - EDV(MOD-SP4): 102 ML
BH CV ECHO MEAS - EF(MOD-SP2): 65.2 %
BH CV ECHO MEAS - EF(MOD-SP4): 54.7 %
BH CV ECHO MEAS - ESV(CUBED): 22 ML
BH CV ECHO MEAS - ESV(MOD-SP2): 22.7 ML
BH CV ECHO MEAS - ESV(MOD-SP4): 46.2 ML
BH CV ECHO MEAS - FS: 39.1 %
BH CV ECHO MEAS - IVS/LVPW: 1 CM
BH CV ECHO MEAS - IVSD: 1.1 CM
BH CV ECHO MEAS - LA DIMENSION: 3.6 CM
BH CV ECHO MEAS - LAT PEAK E' VEL: 8.3 CM/SEC
BH CV ECHO MEAS - LV DIASTOLIC VOL/BSA (35-75): 46.6 CM2
BH CV ECHO MEAS - LV MASS(C)D: 181.2 GRAMS
BH CV ECHO MEAS - LV MAX PG: 5.7 MMHG
BH CV ECHO MEAS - LV MEAN PG: 3 MMHG
BH CV ECHO MEAS - LV SYSTOLIC VOL/BSA (12-30): 21.1 CM2
BH CV ECHO MEAS - LV V1 MAX: 119 CM/SEC
BH CV ECHO MEAS - LV V1 VTI: 27.1 CM
BH CV ECHO MEAS - LVIDD: 4.6 CM
BH CV ECHO MEAS - LVIDS: 2.8 CM
BH CV ECHO MEAS - LVOT AREA: 3.1 CM2
BH CV ECHO MEAS - LVOT DIAM: 2 CM
BH CV ECHO MEAS - LVPWD: 1.1 CM
BH CV ECHO MEAS - MED PEAK E' VEL: 5.2 CM/SEC
BH CV ECHO MEAS - MV A MAX VEL: 98 CM/SEC
BH CV ECHO MEAS - MV DEC SLOPE: 306 CM/SEC2
BH CV ECHO MEAS - MV DEC TIME: 0.26 SEC
BH CV ECHO MEAS - MV E MAX VEL: 74.9 CM/SEC
BH CV ECHO MEAS - MV E/A: 0.76
BH CV ECHO MEAS - MV MAX PG: 4.8 MMHG
BH CV ECHO MEAS - MV MEAN PG: 2 MMHG
BH CV ECHO MEAS - MV P1/2T: 86.9 MSEC
BH CV ECHO MEAS - MV V2 VTI: 31.3 CM
BH CV ECHO MEAS - MVA(P1/2T): 2.5 CM2
BH CV ECHO MEAS - MVA(VTI): 2.7 CM2
BH CV ECHO MEAS - PA ACC TIME: 0.1 SEC
BH CV ECHO MEAS - PA V2 MAX: 103 CM/SEC
BH CV ECHO MEAS - SV(LVOT): 85 ML
BH CV ECHO MEAS - SV(MOD-SP2): 42.6 ML
BH CV ECHO MEAS - SV(MOD-SP4): 55.8 ML
BH CV ECHO MEAS - SVI(LVOT): 38.8 ML/M2
BH CV ECHO MEAS - SVI(MOD-SP2): 19.5 ML/M2
BH CV ECHO MEAS - SVI(MOD-SP4): 25.5 ML/M2
BH CV ECHO MEAS - TAPSE (>1.6): 2.48 CM
BH CV ECHO MEASUREMENTS AVERAGE E/E' RATIO: 11.1
BH CV XLRA - RV BASE: 3.3 CM
BH CV XLRA - RV LENGTH: 7.1 CM
BH CV XLRA - RV MID: 3 CM
BH CV XLRA - TDI S': 7 CM/SEC
BILIRUB SERPL-MCNC: 0.4 MG/DL (ref 0–1.2)
BUN SERPL-MCNC: 16.3 MG/DL (ref 8–23)
BUN SERPL-MCNC: 16.3 MG/DL (ref 8–23)
BUN/CREAT SERPL: 18.5 (ref 7–25)
BUN/CREAT SERPL: 21.2 (ref 7–25)
CALCIUM SPEC-SCNC: 8.5 MG/DL (ref 8.6–10.5)
CALCIUM SPEC-SCNC: 9 MG/DL (ref 8.6–10.5)
CHLORIDE SERPL-SCNC: 100 MMOL/L (ref 98–107)
CHLORIDE SERPL-SCNC: 101 MMOL/L (ref 98–107)
CHOLEST SERPL-MCNC: 181 MG/DL (ref 0–200)
CO2 SERPL-SCNC: 25 MMOL/L (ref 22–29)
CO2 SERPL-SCNC: 29 MMOL/L (ref 22–29)
CREAT SERPL-MCNC: 0.77 MG/DL (ref 0.57–1)
CREAT SERPL-MCNC: 0.88 MG/DL (ref 0.57–1)
DEPRECATED RDW RBC AUTO: 42.5 FL (ref 37–54)
DEPRECATED RDW RBC AUTO: 44.3 FL (ref 37–54)
EGFRCR SERPLBLD CKD-EPI 2021: 72.6 ML/MIN/1.73
EGFRCR SERPLBLD CKD-EPI 2021: 85.2 ML/MIN/1.73
EOSINOPHIL # BLD AUTO: 0.21 10*3/MM3 (ref 0–0.4)
EOSINOPHIL NFR BLD AUTO: 1.9 % (ref 0.3–6.2)
ERYTHROCYTE [DISTWIDTH] IN BLOOD BY AUTOMATED COUNT: 13.6 % (ref 12.3–15.4)
ERYTHROCYTE [DISTWIDTH] IN BLOOD BY AUTOMATED COUNT: 13.7 % (ref 12.3–15.4)
GEN 5 1HR TROPONIN T REFLEX: 349 NG/L
GLOBULIN UR ELPH-MCNC: 2.2 GM/DL
GLUCOSE BLDC GLUCOMTR-MCNC: 242 MG/DL (ref 70–130)
GLUCOSE BLDC GLUCOMTR-MCNC: 276 MG/DL (ref 70–130)
GLUCOSE BLDC GLUCOMTR-MCNC: 282 MG/DL (ref 70–130)
GLUCOSE BLDC GLUCOMTR-MCNC: 297 MG/DL (ref 70–130)
GLUCOSE BLDC GLUCOMTR-MCNC: 344 MG/DL (ref 70–130)
GLUCOSE SERPL-MCNC: 273 MG/DL (ref 65–99)
GLUCOSE SERPL-MCNC: 282 MG/DL (ref 65–99)
HBA1C MFR BLD: 10.4 % (ref 4.8–5.6)
HCT VFR BLD AUTO: 46.2 % (ref 34–46.6)
HCT VFR BLD AUTO: 48.7 % (ref 34–46.6)
HDLC SERPL-MCNC: 27 MG/DL (ref 40–60)
HGB BLD-MCNC: 15.6 G/DL (ref 12–15.9)
HGB BLD-MCNC: 16.7 G/DL (ref 12–15.9)
HOLD SPECIMEN: NORMAL
IMM GRANULOCYTES # BLD AUTO: 0.06 10*3/MM3 (ref 0–0.05)
IMM GRANULOCYTES NFR BLD AUTO: 0.5 % (ref 0–0.5)
INR PPP: 0.95 (ref 0.89–1.12)
LDLC SERPL CALC-MCNC: 108 MG/DL (ref 0–100)
LDLC/HDLC SERPL: 3.73 {RATIO}
LEFT ATRIUM VOLUME INDEX: 17.3 ML/M2
LV EF 2D ECHO EST: 60 %
LV EF BIPLANE MOD: 60.9 %
LYMPHOCYTES # BLD AUTO: 3.71 10*3/MM3 (ref 0.7–3.1)
LYMPHOCYTES NFR BLD AUTO: 33.5 % (ref 19.6–45.3)
MAGNESIUM SERPL-MCNC: 1.7 MG/DL (ref 1.6–2.4)
MCH RBC QN AUTO: 29.2 PG (ref 26.6–33)
MCH RBC QN AUTO: 30.2 PG (ref 26.6–33)
MCHC RBC AUTO-ENTMCNC: 33.8 G/DL (ref 31.5–35.7)
MCHC RBC AUTO-ENTMCNC: 34.3 G/DL (ref 31.5–35.7)
MCV RBC AUTO: 86.5 FL (ref 79–97)
MCV RBC AUTO: 88.1 FL (ref 79–97)
MONOCYTES # BLD AUTO: 1.02 10*3/MM3 (ref 0.1–0.9)
MONOCYTES NFR BLD AUTO: 9.2 % (ref 5–12)
NEUTROPHILS NFR BLD AUTO: 54.3 % (ref 42.7–76)
NEUTROPHILS NFR BLD AUTO: 6 10*3/MM3 (ref 1.7–7)
NRBC BLD AUTO-RTO: 0 /100 WBC (ref 0–0.2)
NT-PROBNP SERPL-MCNC: 108.7 PG/ML (ref 0–900)
PLATELET # BLD AUTO: 173 10*3/MM3 (ref 140–450)
PLATELET # BLD AUTO: 182 10*3/MM3 (ref 140–450)
PMV BLD AUTO: 10.9 FL (ref 6–12)
PMV BLD AUTO: 11 FL (ref 6–12)
POTASSIUM SERPL-SCNC: 4.2 MMOL/L (ref 3.5–5.2)
POTASSIUM SERPL-SCNC: 4.3 MMOL/L (ref 3.5–5.2)
PROT SERPL-MCNC: 6.3 G/DL (ref 6–8.5)
PROTHROMBIN TIME: 13.2 SECONDS (ref 12.2–15.3)
QT INTERVAL: 392 MS
QT INTERVAL: 412 MS
QTC INTERVAL: 420 MS
QTC INTERVAL: 444 MS
RBC # BLD AUTO: 5.34 10*6/MM3 (ref 3.77–5.28)
RBC # BLD AUTO: 5.53 10*6/MM3 (ref 3.77–5.28)
SODIUM SERPL-SCNC: 135 MMOL/L (ref 136–145)
SODIUM SERPL-SCNC: 140 MMOL/L (ref 136–145)
TRIGL SERPL-MCNC: 267 MG/DL (ref 0–150)
TROPONIN T % DELTA: 88
TROPONIN T NUMERIC DELTA: 163 NG/L
TROPONIN T SERPL HS-MCNC: 186 NG/L
TROPONIN T SERPL HS-MCNC: 41 NG/L
UFH PPP CHRO-ACNC: 0.1 IU/ML (ref 0.3–0.7)
VLDLC SERPL-MCNC: 46 MG/DL (ref 5–40)
WBC NRBC COR # BLD AUTO: 10.9 10*3/MM3 (ref 3.4–10.8)
WBC NRBC COR # BLD AUTO: 11.07 10*3/MM3 (ref 3.4–10.8)
WHOLE BLOOD HOLD COAG: NORMAL
WHOLE BLOOD HOLD SPECIMEN: NORMAL

## 2025-07-05 PROCEDURE — 93306 TTE W/DOPPLER COMPLETE: CPT

## 2025-07-05 PROCEDURE — C1769 GUIDE WIRE: HCPCS | Performed by: INTERNAL MEDICINE

## 2025-07-05 PROCEDURE — 85610 PROTHROMBIN TIME: CPT | Performed by: EMERGENCY MEDICINE

## 2025-07-05 PROCEDURE — 83036 HEMOGLOBIN GLYCOSYLATED A1C: CPT | Performed by: EMERGENCY MEDICINE

## 2025-07-05 PROCEDURE — 82948 REAGENT STRIP/BLOOD GLUCOSE: CPT

## 2025-07-05 PROCEDURE — C1894 INTRO/SHEATH, NON-LASER: HCPCS | Performed by: INTERNAL MEDICINE

## 2025-07-05 PROCEDURE — 36415 COLL VENOUS BLD VENIPUNCTURE: CPT

## 2025-07-05 PROCEDURE — 25510000001 IOPAMIDOL PER 1 ML: Performed by: INTERNAL MEDICINE

## 2025-07-05 PROCEDURE — 93005 ELECTROCARDIOGRAM TRACING: CPT | Performed by: INTERNAL MEDICINE

## 2025-07-05 PROCEDURE — 99285 EMERGENCY DEPT VISIT HI MDM: CPT

## 2025-07-05 PROCEDURE — 99223 1ST HOSP IP/OBS HIGH 75: CPT | Performed by: INTERNAL MEDICINE

## 2025-07-05 PROCEDURE — 92941 PRQ TRLML REVSC TOT OCCL AMI: CPT | Performed by: INTERNAL MEDICINE

## 2025-07-05 PROCEDURE — 4A023N7 MEASUREMENT OF CARDIAC SAMPLING AND PRESSURE, LEFT HEART, PERCUTANEOUS APPROACH: ICD-10-PCS | Performed by: INTERNAL MEDICINE

## 2025-07-05 PROCEDURE — 93306 TTE W/DOPPLER COMPLETE: CPT | Performed by: INTERNAL MEDICINE

## 2025-07-05 PROCEDURE — 25010000002 HEPARIN (PORCINE) PER 1000 UNITS: Performed by: INTERNAL MEDICINE

## 2025-07-05 PROCEDURE — 93010 ELECTROCARDIOGRAM REPORT: CPT | Performed by: INTERNAL MEDICINE

## 2025-07-05 PROCEDURE — 027034Z DILATION OF CORONARY ARTERY, ONE ARTERY WITH DRUG-ELUTING INTRALUMINAL DEVICE, PERCUTANEOUS APPROACH: ICD-10-PCS | Performed by: INTERNAL MEDICINE

## 2025-07-05 PROCEDURE — 25010000002 NICARDIPINE 2.5 MG/ML SOLUTION: Performed by: INTERNAL MEDICINE

## 2025-07-05 PROCEDURE — C1725 CATH, TRANSLUMIN NON-LASER: HCPCS | Performed by: INTERNAL MEDICINE

## 2025-07-05 PROCEDURE — 63710000001 INSULIN LISPRO (HUMAN) PER 5 UNITS: Performed by: NURSE PRACTITIONER

## 2025-07-05 PROCEDURE — 83735 ASSAY OF MAGNESIUM: CPT | Performed by: EMERGENCY MEDICINE

## 2025-07-05 PROCEDURE — B2151ZZ FLUOROSCOPY OF LEFT HEART USING LOW OSMOLAR CONTRAST: ICD-10-PCS | Performed by: INTERNAL MEDICINE

## 2025-07-05 PROCEDURE — 25810000003 SODIUM CHLORIDE 0.9 % SOLUTION: Performed by: INTERNAL MEDICINE

## 2025-07-05 PROCEDURE — 93005 ELECTROCARDIOGRAM TRACING: CPT | Performed by: EMERGENCY MEDICINE

## 2025-07-05 PROCEDURE — 84484 ASSAY OF TROPONIN QUANT: CPT | Performed by: INTERNAL MEDICINE

## 2025-07-05 PROCEDURE — 25010000002 MIDAZOLAM PER 1 MG: Performed by: INTERNAL MEDICINE

## 2025-07-05 PROCEDURE — 85025 COMPLETE CBC W/AUTO DIFF WBC: CPT | Performed by: EMERGENCY MEDICINE

## 2025-07-05 PROCEDURE — 25810000003 SODIUM CHLORIDE 0.9 % SOLUTION 250 ML FLEX CONT: Performed by: INTERNAL MEDICINE

## 2025-07-05 PROCEDURE — 80053 COMPREHEN METABOLIC PANEL: CPT | Performed by: EMERGENCY MEDICINE

## 2025-07-05 PROCEDURE — 93458 L HRT ARTERY/VENTRICLE ANGIO: CPT | Performed by: INTERNAL MEDICINE

## 2025-07-05 PROCEDURE — 85520 HEPARIN ASSAY: CPT | Performed by: EMERGENCY MEDICINE

## 2025-07-05 PROCEDURE — 85347 COAGULATION TIME ACTIVATED: CPT

## 2025-07-05 PROCEDURE — 63710000001 INSULIN GLARGINE PER 5 UNITS: Performed by: NURSE PRACTITIONER

## 2025-07-05 PROCEDURE — C9606 PERC D-E COR REVASC W AMI S: HCPCS | Performed by: INTERNAL MEDICINE

## 2025-07-05 PROCEDURE — C1874 STENT, COATED/COV W/DEL SYS: HCPCS | Performed by: INTERNAL MEDICINE

## 2025-07-05 PROCEDURE — 84484 ASSAY OF TROPONIN QUANT: CPT | Performed by: EMERGENCY MEDICINE

## 2025-07-05 PROCEDURE — 71045 X-RAY EXAM CHEST 1 VIEW: CPT

## 2025-07-05 PROCEDURE — B2111ZZ FLUOROSCOPY OF MULTIPLE CORONARY ARTERIES USING LOW OSMOLAR CONTRAST: ICD-10-PCS | Performed by: INTERNAL MEDICINE

## 2025-07-05 PROCEDURE — 25010000002 HEPARIN (PORCINE) PER 1000 UNITS: Performed by: EMERGENCY MEDICINE

## 2025-07-05 PROCEDURE — 85027 COMPLETE CBC AUTOMATED: CPT | Performed by: INTERNAL MEDICINE

## 2025-07-05 PROCEDURE — C9460 INJECTION, CANGRELOR: HCPCS | Performed by: INTERNAL MEDICINE

## 2025-07-05 PROCEDURE — 63710000001 INSULIN REGULAR HUMAN PER 5 UNITS: Performed by: STUDENT IN AN ORGANIZED HEALTH CARE EDUCATION/TRAINING PROGRAM

## 2025-07-05 PROCEDURE — C1887 CATHETER, GUIDING: HCPCS | Performed by: INTERNAL MEDICINE

## 2025-07-05 PROCEDURE — 25010000002 CANGRELOR TETRASODIUM 50 MG RECONSTITUTED SOLUTION 1 EACH VIAL: Performed by: INTERNAL MEDICINE

## 2025-07-05 PROCEDURE — 80061 LIPID PANEL: CPT | Performed by: INTERNAL MEDICINE

## 2025-07-05 PROCEDURE — 83880 ASSAY OF NATRIURETIC PEPTIDE: CPT | Performed by: EMERGENCY MEDICINE

## 2025-07-05 PROCEDURE — 25010000002 LIDOCAINE PF 1% 1 % SOLUTION: Performed by: INTERNAL MEDICINE

## 2025-07-05 DEVICE — XIENCE SKYPOINT™ EVEROLIMUS ELUTING CORONARY STENT SYSTEM 2.50 MM X 18 MM / RAPID-EXCHANGE
Type: IMPLANTABLE DEVICE | Site: CORONARY | Status: FUNCTIONAL
Brand: XIENCE SKYPOINT™

## 2025-07-05 RX ORDER — NITROGLYCERIN 0.4 MG/1
0.4 TABLET SUBLINGUAL
Status: DISCONTINUED | OUTPATIENT
Start: 2025-07-05 | End: 2025-07-07 | Stop reason: HOSPADM

## 2025-07-05 RX ORDER — ALUMINA, MAGNESIA, AND SIMETHICONE 2400; 2400; 240 MG/30ML; MG/30ML; MG/30ML
15 SUSPENSION ORAL EVERY 6 HOURS PRN
Status: DISCONTINUED | OUTPATIENT
Start: 2025-07-05 | End: 2025-07-07 | Stop reason: HOSPADM

## 2025-07-05 RX ORDER — IOPAMIDOL 755 MG/ML
INJECTION, SOLUTION INTRAVASCULAR
Status: DISCONTINUED | OUTPATIENT
Start: 2025-07-05 | End: 2025-07-05 | Stop reason: HOSPADM

## 2025-07-05 RX ORDER — HEPARIN SODIUM 1000 [USP'U]/ML
4000 INJECTION, SOLUTION INTRAVENOUS; SUBCUTANEOUS AS NEEDED
Status: DISCONTINUED | OUTPATIENT
Start: 2025-07-05 | End: 2025-07-05 | Stop reason: DRUGHIGH

## 2025-07-05 RX ORDER — INSULIN LISPRO 100 [IU]/ML
2-9 INJECTION, SOLUTION INTRAVENOUS; SUBCUTANEOUS
Status: DISCONTINUED | OUTPATIENT
Start: 2025-07-05 | End: 2025-07-06

## 2025-07-05 RX ORDER — HEPARIN SODIUM 1000 [USP'U]/ML
6240 INJECTION, SOLUTION INTRAVENOUS; SUBCUTANEOUS ONCE
Status: COMPLETED | OUTPATIENT
Start: 2025-07-05 | End: 2025-07-05

## 2025-07-05 RX ORDER — CARVEDILOL 3.12 MG/1
3.12 TABLET ORAL 2 TIMES DAILY WITH MEALS
Status: DISCONTINUED | OUTPATIENT
Start: 2025-07-05 | End: 2025-07-07

## 2025-07-05 RX ORDER — LIDOCAINE HYDROCHLORIDE 10 MG/ML
INJECTION, SOLUTION EPIDURAL; INFILTRATION; INTRACAUDAL; PERINEURAL
Status: DISCONTINUED | OUTPATIENT
Start: 2025-07-05 | End: 2025-07-05 | Stop reason: HOSPADM

## 2025-07-05 RX ORDER — MONTELUKAST SODIUM 10 MG/1
10 TABLET ORAL DAILY
Status: DISCONTINUED | OUTPATIENT
Start: 2025-07-05 | End: 2025-07-07 | Stop reason: HOSPADM

## 2025-07-05 RX ORDER — LOSARTAN POTASSIUM 50 MG/1
50 TABLET ORAL DAILY
Status: DISCONTINUED | OUTPATIENT
Start: 2025-07-05 | End: 2025-07-07 | Stop reason: HOSPADM

## 2025-07-05 RX ORDER — ONDANSETRON 2 MG/ML
4 INJECTION INTRAMUSCULAR; INTRAVENOUS EVERY 6 HOURS PRN
Status: DISCONTINUED | OUTPATIENT
Start: 2025-07-05 | End: 2025-07-07 | Stop reason: HOSPADM

## 2025-07-05 RX ORDER — ROSUVASTATIN CALCIUM 20 MG/1
20 TABLET, COATED ORAL NIGHTLY
Status: DISCONTINUED | OUTPATIENT
Start: 2025-07-05 | End: 2025-07-07 | Stop reason: HOSPADM

## 2025-07-05 RX ORDER — HEPARIN SODIUM 10000 [USP'U]/100ML
9.6 INJECTION, SOLUTION INTRAVENOUS
Status: DISCONTINUED | OUTPATIENT
Start: 2025-07-05 | End: 2025-07-05

## 2025-07-05 RX ORDER — TICAGRELOR 90 MG/1
TABLET, FILM COATED ORAL
Status: DISCONTINUED | OUTPATIENT
Start: 2025-07-05 | End: 2025-07-05 | Stop reason: HOSPADM

## 2025-07-05 RX ORDER — ACETAMINOPHEN 325 MG/1
650 TABLET ORAL EVERY 4 HOURS PRN
Status: DISCONTINUED | OUTPATIENT
Start: 2025-07-05 | End: 2025-07-07 | Stop reason: HOSPADM

## 2025-07-05 RX ORDER — IBUPROFEN 600 MG/1
1 TABLET ORAL
Status: DISCONTINUED | OUTPATIENT
Start: 2025-07-05 | End: 2025-07-07 | Stop reason: HOSPADM

## 2025-07-05 RX ORDER — ASPIRIN 81 MG/1
81 TABLET, CHEWABLE ORAL DAILY
Status: DISCONTINUED | OUTPATIENT
Start: 2025-07-06 | End: 2025-07-07 | Stop reason: HOSPADM

## 2025-07-05 RX ORDER — SODIUM CHLORIDE 9 MG/ML
100 INJECTION, SOLUTION INTRAVENOUS CONTINUOUS
Status: ACTIVE | OUTPATIENT
Start: 2025-07-05 | End: 2025-07-05

## 2025-07-05 RX ORDER — LEVOTHYROXINE SODIUM 25 UG/1
25 TABLET ORAL
Status: DISCONTINUED | OUTPATIENT
Start: 2025-07-05 | End: 2025-07-07 | Stop reason: HOSPADM

## 2025-07-05 RX ORDER — HEPARIN SODIUM 1000 [USP'U]/ML
2000 INJECTION, SOLUTION INTRAVENOUS; SUBCUTANEOUS AS NEEDED
Status: DISCONTINUED | OUTPATIENT
Start: 2025-07-05 | End: 2025-07-05 | Stop reason: DRUGHIGH

## 2025-07-05 RX ORDER — HEPARIN SODIUM 1000 [USP'U]/ML
INJECTION, SOLUTION INTRAVENOUS; SUBCUTANEOUS
Status: DISCONTINUED | OUTPATIENT
Start: 2025-07-05 | End: 2025-07-05 | Stop reason: HOSPADM

## 2025-07-05 RX ORDER — DEXTROSE MONOHYDRATE 25 G/50ML
25 INJECTION, SOLUTION INTRAVENOUS
Status: DISCONTINUED | OUTPATIENT
Start: 2025-07-05 | End: 2025-07-05 | Stop reason: SDUPTHER

## 2025-07-05 RX ORDER — ONDANSETRON 4 MG/1
4 TABLET, ORALLY DISINTEGRATING ORAL EVERY 6 HOURS PRN
Status: DISCONTINUED | OUTPATIENT
Start: 2025-07-05 | End: 2025-07-07 | Stop reason: HOSPADM

## 2025-07-05 RX ORDER — ALPRAZOLAM 0.25 MG
0.25 TABLET ORAL 3 TIMES DAILY PRN
Status: DISCONTINUED | OUTPATIENT
Start: 2025-07-05 | End: 2025-07-07 | Stop reason: HOSPADM

## 2025-07-05 RX ORDER — TICAGRELOR 90 MG/1
90 TABLET, FILM COATED ORAL 2 TIMES DAILY
Status: DISCONTINUED | OUTPATIENT
Start: 2025-07-05 | End: 2025-07-07 | Stop reason: HOSPADM

## 2025-07-05 RX ORDER — NICOTINE POLACRILEX 4 MG
15 LOZENGE BUCCAL
Status: DISCONTINUED | OUTPATIENT
Start: 2025-07-05 | End: 2025-07-07 | Stop reason: HOSPADM

## 2025-07-05 RX ORDER — MIDAZOLAM HYDROCHLORIDE 1 MG/ML
INJECTION, SOLUTION INTRAMUSCULAR; INTRAVENOUS
Status: DISCONTINUED | OUTPATIENT
Start: 2025-07-05 | End: 2025-07-05 | Stop reason: HOSPADM

## 2025-07-05 RX ORDER — NICOTINE 21 MG/24HR
1 PATCH, TRANSDERMAL 24 HOURS TRANSDERMAL EVERY 24 HOURS
Status: DISCONTINUED | OUTPATIENT
Start: 2025-07-05 | End: 2025-07-07 | Stop reason: HOSPADM

## 2025-07-05 RX ORDER — DEXTROSE MONOHYDRATE 25 G/50ML
25 INJECTION, SOLUTION INTRAVENOUS
Status: DISCONTINUED | OUTPATIENT
Start: 2025-07-05 | End: 2025-07-07 | Stop reason: HOSPADM

## 2025-07-05 RX ORDER — NICOTINE POLACRILEX 4 MG
15 LOZENGE BUCCAL
Status: DISCONTINUED | OUTPATIENT
Start: 2025-07-05 | End: 2025-07-05 | Stop reason: SDUPTHER

## 2025-07-05 RX ORDER — SODIUM CHLORIDE 9 MG/ML
10 INJECTION, SOLUTION INTRAMUSCULAR; INTRAVENOUS; SUBCUTANEOUS AS NEEDED
Status: DISCONTINUED | OUTPATIENT
Start: 2025-07-05 | End: 2025-07-07 | Stop reason: HOSPADM

## 2025-07-05 RX ADMIN — SODIUM CHLORIDE 100 ML/HR: 9 INJECTION, SOLUTION INTRAVENOUS at 08:06

## 2025-07-05 RX ADMIN — SODIUM CHLORIDE 10 ML: 9 INJECTION, SOLUTION INTRAMUSCULAR; INTRAVENOUS; SUBCUTANEOUS at 08:05

## 2025-07-05 RX ADMIN — INSULIN LISPRO 7 UNITS: 100 INJECTION, SOLUTION INTRAVENOUS; SUBCUTANEOUS at 21:25

## 2025-07-05 RX ADMIN — HEPARIN SODIUM 6240 UNITS: 1000 INJECTION INTRAVENOUS; SUBCUTANEOUS at 04:48

## 2025-07-05 RX ADMIN — ROSUVASTATIN 20 MG: 20 TABLET, FILM COATED ORAL at 08:05

## 2025-07-05 RX ADMIN — TICAGRELOR 90 MG: 90 TABLET ORAL at 21:25

## 2025-07-05 RX ADMIN — MONTELUKAST 10 MG: 10 TABLET, FILM COATED ORAL at 08:04

## 2025-07-05 RX ADMIN — INSULIN GLARGINE 8 UNITS: 100 INJECTION, SOLUTION SUBCUTANEOUS at 12:17

## 2025-07-05 RX ADMIN — INSULIN LISPRO 6 UNITS: 100 INJECTION, SOLUTION INTRAVENOUS; SUBCUTANEOUS at 12:17

## 2025-07-05 RX ADMIN — LEVOTHYROXINE SODIUM 25 MCG: 0.03 TABLET ORAL at 08:04

## 2025-07-05 RX ADMIN — INSULIN LISPRO 4 UNITS: 100 INJECTION, SOLUTION INTRAVENOUS; SUBCUTANEOUS at 17:01

## 2025-07-05 RX ADMIN — LOSARTAN POTASSIUM 50 MG: 50 TABLET, FILM COATED ORAL at 08:04

## 2025-07-05 RX ADMIN — INSULIN HUMAN 6 UNITS: 100 INJECTION, SOLUTION PARENTERAL at 08:04

## 2025-07-05 RX ADMIN — CARVEDILOL 3.12 MG: 3.12 TABLET, FILM COATED ORAL at 08:04

## 2025-07-05 RX ADMIN — CARVEDILOL 3.12 MG: 3.12 TABLET, FILM COATED ORAL at 17:00

## 2025-07-05 NOTE — H&P
Date of Hospital Visit: 25  Encounter Provider: John Scales MD  Place of Service: [facility]  Patient Name: Cori Moya  :1960  Referral Provider: No ref. provider found  Primary Care Provider: Pepper Delarosa APRN    Chief complaint: Chest pain/field STEMI activation    History of Present Illness:  The patient is a 65-year-old female with past medical history of hypertension, diabetes and dyslipidemia.  She is a long-term smoker of more than 1 pack/day.  She has no prior cardiac history.  Patient states that she was in her usual state of health yesterday.  She was outdoors all day long and felt dehydrated towards the end of the evening.  States that she drank a lot of Gatorade.  At around 1030 last night she started experiencing bilateral arm discomfort and some jaw discomfort which was intermittent.  She was finally able to put herself to sleep despite the ongoing discomfort.  She awakened from sleep at around 3:30 AM this morning with severe crushing anterior chest pain with jaw pain and arm pain.  At this point she became concerned and called the ambulance.  Field EKG showed acute anterior ST elevations.  She was given nitroglycerin and aspirin and was brought to the emergency department.  Upon arrival here her symptoms had almost completely resolved and repeat EKG showed resolution of ST segment elevations.  At the time of my evaluation in the ER she is denying any current chest pain shortness of breath arm pain or jaw pain.  Review of systems is remarkable for chronic mild edema which improves with leg elevation.  She is denying any orthopnea or PND.  No palpitations dizziness lightheadedness or syncope.    Past Medical History:   Diagnosis Date    Idiopathic intracranial hypertension/history of pseudotumor cerebri/status post craniotomy        Past Surgical History:   Procedure Laterality Date    CARPAL TUNNEL RELEASE       SECTION      DENTAL PROCEDURE       "OPTIC NERVE SHEATH FENESTRATIAN      POSTERIOR FOSSA DECOMPRESSION      TONSILLECTOMY      TUBAL ABDOMINAL LIGATION         Medications Prior to Admission   Medication Sig Dispense Refill Last Dose/Taking    Dapagliflozin Propanediol (FARXIGA PO) Take  by mouth.       ketorolac (TORADOL) 10 MG tablet Take 1 tablet by mouth Every 6 (Six) Hours As Needed for Moderate Pain. 12 tablet 0     levothyroxine (SYNTHROID, LEVOTHROID) 25 MCG tablet TAKE ONE TABLET BY MOUTH EVERY DAY IN THE MORNING ON AN EMPTY STOMACH WITH COOL WATER. EAT ONE HOUR LATER       lisinopril (PRINIVIL,ZESTRIL) 20 MG tablet Take 1 tablet by mouth Every 12 (Twelve) Hours.       montelukast (Singulair) 10 MG tablet Take 1 tablet by mouth.       sodium-potassium-magnesium sulfates (Suprep Bowel Prep Kit) 17.5-3.13-1.6 GM/177ML solution oral solution Take 2 bottles by mouth Take As Directed. Do not eat the day before your procedure. If you didn't receive instructions call [phone]. 354 mL 0     vitamin D3 125 MCG (5000 UT) capsule capsule Take 1 capsule by mouth Daily.          Social History     Socioeconomic History    Marital status: Single   Tobacco Use    Smoking status: Every Day     Types: Cigarettes    Smokeless tobacco: Never   Vaping Use    Vaping status: Never Used   Substance and Sexual Activity    Alcohol use: Never    Drug use: Defer    Sexual activity: Defer       No family history on file.  Remarkable for atrial fibrillation in her mother    REVIEW OF SYSTEMS:     12 point ROS was performed and is Negative except as outlined in HPI     Objective:     Vitals:    07/05/25 0450 07/05/25 0451 07/05/25 0504 07/05/25 0539   BP:   151/88 147/81   Pulse: 72 75 74 74   Resp:   18 18   Temp:       TempSrc:       SpO2: 98% 97% 95% 98%   Weight:       Height:         Body mass index is 36.02 kg/m².  Flowsheet Rows      Flowsheet Row First Filed Value   Admission Height 170.2 cm (67\") Documented at 07/05/2025 0431   Admission Weight 104 kg (230 " lb) Documented at 07/05/2025 0431            Physical Exam   General: No acute distress, well developed and well nourished.    Skin: Skin is warm and dry. No obvious cyanosis, erythema or pallor.   HEENT: Atraumatic, normocephalic, no conjunctival pallor, no scleral icterus.   Neck: Supple, no JVD. Normal carotid upstrokes, no bruits.    Chest:No respiratory distress. No chest wall tenderness. Breath sounds are normal. No wheezes, rhonchi or rales.  Cardiovascular: Normal S1 and S2, no murmur, gallop or rub. PMI is not displaced.    Pulses:Radial and pedal pulses are 2+ and symmetric.    Abdomen: Soft, nontender, normal bowel sounds.   Musculoskeletal/Extremities:  No clubbing, cyanosis or edema. No gross deformity.   Neurological: Alert and oriented to person, place, and time, no gross focal deficits.   Psychiatric: Normal mood and affect.Speech and behavior is normal.    Lab Review:                Results from last 7 days   Lab Units 07/05/25  0441   SODIUM mmol/L 140   POTASSIUM mmol/L 4.3   CHLORIDE mmol/L 101   CO2 mmol/L 29.0   BUN mg/dL 16.3   CREATININE mg/dL 0.88   GLUCOSE mg/dL 273*   CALCIUM mg/dL 9.0     Results from last 7 days   Lab Units 07/05/25  0441   HSTROP T ng/L 41*     Results from last 7 days   Lab Units 07/05/25 0441   WBC 10*3/mm3 11.07*   HEMOGLOBIN g/dL 16.7*   HEMATOCRIT % 48.7*   PLATELETS 10*3/mm3 173     Results from last 7 days   Lab Units 07/05/25  0441   INR  0.95     Results from last 7 days   Lab Units 07/05/25  0441   MAGNESIUM mg/dL 1.7     Results from last 7 days   Lab Units 07/05/25  0441   CHOLESTEROL mg/dL 181   TRIGLYCERIDES mg/dL 267*   HDL CHOL mg/dL 27*   LDL CHOL mg/dL 108*     @LABRCNT(bnp)@  Imaging Results (Last 24 Hours)       Procedure Component Value Units Date/Time    XR Chest 1 View [[id number]] Collected: 07/05/25 0453     Updated: 07/05/25 0457    Narrative:      XR CHEST 1 VW    Date of Exam: 7/5/2025 4:38 AM EDT    Indication: Acute STEMI traige  protocol    Comparison: 6/30/2024.    Findings:  There are no airspace consolidations. No pleural fluid. No pneumothorax. The pulmonary vasculature appears within normal limits. The cardiac and mediastinal silhouette appear unremarkable. No acute osseous abnormality identified.      Impression:      Impression:  No acute cardiopulmonary process      Electronically Signed: Jocelyn Rankin MD    7/5/2025 4:54 AM EDT    Workstation ID: [id number]             EKG: EMS EKG shows sinus rhythm with acute anterior ST elevation MI.  Repeat EKG in the ER shows sinus rhythm with nonspecific changes and resolution of ST segment elevations.     Assessment:   ACS/acute anterior ST elevation MI based on symptoms and field EKG, these have now resolved suggesting spontaneously aborted STEMI.  Hypertension.  Type 2 diabetes.  Dyslipidemia.  Chronic heavy smoker.  Obesity.    Plan:   Although her symptoms and EKG abnormalities have improved she definitely had ST elevation suggesting presence of unstable coronary disease.  We will give her heparin bolus in the ER, she already received aspirin.  Will proceed to cardiac cath for further evaluation of coronary anatomy, PCI if indicated.  The procedure was explained to the patient/family extensively. Indications, benefits, risks and alternatives were discussed. The patient understands well, and wishes to proceed.   Subsequently should be admitted to telemetry, we will address guideline directed medical management and risk factor modification.  We will involve hospitalist for medical management of multiple conditions.  Further management per clinical course.    John Scales MD, FACC, [facility]

## 2025-07-05 NOTE — ED PROVIDER NOTES
Subjective   History of Present Illness  65 year old female with history of HTN, DM2, tobacco use disorder, and dyslipidemia presents to the emergency department brought by EMS for evaluation of chest pain with associated nausea. She states she was outdoors on  in the hot weather, and noticed she was sweating quite a bit and had decreased urine output. She started getting cramps in her hands and drank a lot of gatorade. She developed chest pain across her entire chest radiating to her neck, jaw, and both arms around bed time last night, which worsened shortly prior to arrival. She had 500 mL normal saline, zofran, aspirin 325 mg, and 1 nitroglycerin SL prior to arrival with improvement in her symptoms. EMS activated STEMI alert prior to arrival. Pain is currently 1/10 per patient. She denies prior known cardiac disease.      Review of Systems   Constitutional:  Positive for diaphoresis. Negative for fever.   HENT:  Positive for postnasal drip.    Eyes:  Negative for photophobia and discharge.   Respiratory:  Positive for cough.    Cardiovascular:  Positive for chest pain.   Gastrointestinal:  Positive for nausea.   Neurological:  Negative for speech difficulty.       Past Medical History:   Diagnosis Date    Idiopathic intracranial hypertension    HTN  DM2  Obesity  Dyslipidemia (high LDL per patient)    Allergies   Allergen Reactions    Aspirin Other (See Comments)     Knots on legs  Tolerates ketorolac       Past Surgical History:   Procedure Laterality Date    CARPAL TUNNEL RELEASE       SECTION      DENTAL PROCEDURE      OPTIC NERVE SHEATH FENESTRATIAN      POSTERIOR FOSSA DECOMPRESSION      TONSILLECTOMY      TUBAL ABDOMINAL LIGATION         No family history on file.    Social History     Socioeconomic History    Marital status: Single   Tobacco Use    Smoking status: Every Day     Types: Cigarettes    Smokeless tobacco: Never   Vaping Use    Vaping status: Never Used   Substance and Sexual Activity     Alcohol use: Never    Drug use: Defer    Sexual activity: Defer           Objective   Physical Exam  Vitals and nursing note reviewed.   Constitutional:       General: She is not in acute distress.     Appearance: She is not diaphoretic.   Neck:      Vascular: No JVD.      Trachea: No tracheal deviation.   Cardiovascular:      Rate and Rhythm: Normal rate and regular rhythm.      Heart sounds: No murmur heard.     No friction rub. No gallop.      Comments: S1, S2  Pulmonary:      Effort: Pulmonary effort is normal. No tachypnea or respiratory distress.      Breath sounds: Normal breath sounds. No stridor. No decreased breath sounds, wheezing, rhonchi or rales.      Comments: Coughs during exam.  Musculoskeletal:      Comments: 2+ radial pulses bilaterally, symmetric. No significant peripheral edema. No calf tenderness bilaterally.    Skin:     General: Skin is warm and dry.   Neurological:      Mental Status: She is alert.         Procedures           ED Course  ED Course as of 07/05/25 0531   Sat Jul 05, 2025   0441 I discussed the patient with Dr. Scales cardiology, recommends send patient to Cath Lab and heparin, no plavix.  Patient had aspirin prior to arrival by EMS. [LD]   0450 POC creatinine 1.0. POC glucose 297.  [LD]   0454 Glucose(!): 297 [LD]   0530 WBC(!): 11.07 [LD]   0530 Hemoglobin(!): 16.7 [LD]   0530 Platelets: 173 [LD]   0531 Creatinine: 0.88 [LD]   0531 BUN: 16.3 [LD]   0531 Glucose(!): 273 [LD]   0531 Sodium: 140 [LD]   0531 Potassium: 4.3 [LD]   0531 Chloride: 101 [LD]   0531 CO2: 29.0 [LD]   0531 Calcium: 9.0 [LD]   0531 Total Protein: 6.3 [LD]   0531 Albumin: 4.1 [LD]   0531 ALT (SGPT): 21 [LD]   0531 AST (SGOT): 19 [LD]   0531 Alkaline Phosphatase(!): 151 [LD]   0531 Total Bilirubin: 0.4 [LD]   0531 eGFR: 72.6 [LD]   0531 HS Troponin T(!): 41 [LD]   0531 Magnesium: 1.7 [LD]   0531 proBNP: 108.7 [LD]      ED Course User Index  [LD] Ashlie Walker MD                                                        Medical Decision Making  Differential diagnosis includes STEMI, vasospasm, SCAD, esophageal spasm, esophagitis, and others.     Problems Addressed:  Hyperglycemia due to diabetes mellitus: complicated acute illness or injury  ST elevation myocardial infarction (STEMI), unspecified artery: complicated acute illness or injury  Tobacco use disorder: complicated acute illness or injury    Amount and/or Complexity of Data Reviewed  Independent Historian: EMS  External Data Reviewed:      Details: I reviewed the prehospital EKG which shows anterior ST elevation. See image.  Labs: ordered. Decision-making details documented in ED Course.     Details: POC labs reviewed. Lab results pending at the time she is going to the cath lab.  Radiology: ordered and independent interpretation performed. Decision-making details documented in ED Course.     Details: Increased interstitial markings, no focal infiltrate, no pleural effusion, no pneumothorax per my interpretation. Official radiologist's interpretation pending at the time she is going to the cath lab.  ECG/medicine tests: ordered and independent interpretation performed. Decision-making details documented in ED Course.     Details: EKG at 0436 shows normal sinus rhythm at a rate of 70 beats per minute, normal intervals, no acute ischemic changes.   Discussion of management or test interpretation with external provider(s): Dr. Scales cardiologist aware, discussed the patient by phone.    Risk  OTC drugs.  Prescription drug management.      Recent Results (from the past 24 hours)   ECG 12 Lead ED Triage Standing Order; Acute STEMI    Collection Time: 07/05/25  4:36 AM   Result Value Ref Range    QT Interval 412 ms    QTC Interval 444 ms   High Sensitivity Troponin T    Collection Time: 07/05/25  4:41 AM    Specimen: Blood   Result Value Ref Range    HS Troponin T 41 (H) <14 ng/L   Magnesium    Collection Time: 07/05/25  4:41 AM    Specimen: Blood   Result Value  Ref Range    Magnesium 1.7 1.6 - 2.4 mg/dL   BNP    Collection Time: 07/05/25  4:41 AM    Specimen: Blood   Result Value Ref Range    proBNP 108.7 0.0 - 900.0 pg/mL   Green Top (Gel)    Collection Time: 07/05/25  4:41 AM   Result Value Ref Range    Extra Tube Hold for add-ons.    Lavender Top    Collection Time: 07/05/25  4:41 AM   Result Value Ref Range    Extra Tube hold for add-on    Gold Top - SST    Collection Time: 07/05/25  4:41 AM   Result Value Ref Range    Extra Tube Hold for add-ons.    Gray Top    Collection Time: 07/05/25  4:41 AM   Result Value Ref Range    Extra Tube Hold for add-ons.    Light Blue Top    Collection Time: 07/05/25  4:41 AM   Result Value Ref Range    Extra Tube Hold for add-ons.    CBC Auto Differential    Collection Time: 07/05/25  4:41 AM    Specimen: Blood   Result Value Ref Range    WBC 11.07 (H) 3.40 - 10.80 10*3/mm3    RBC 5.53 (H) 3.77 - 5.28 10*6/mm3    Hemoglobin 16.7 (H) 12.0 - 15.9 g/dL    Hematocrit 48.7 (H) 34.0 - 46.6 %    MCV 88.1 79.0 - 97.0 fL    MCH 30.2 26.6 - 33.0 pg    MCHC 34.3 31.5 - 35.7 g/dL    RDW 13.7 12.3 - 15.4 %    RDW-SD 44.3 37.0 - 54.0 fl    MPV 11.0 6.0 - 12.0 fL    Platelets 173 140 - 450 10*3/mm3    Neutrophil % 54.3 42.7 - 76.0 %    Lymphocyte % 33.5 19.6 - 45.3 %    Monocyte % 9.2 5.0 - 12.0 %    Eosinophil % 1.9 0.3 - 6.2 %    Basophil % 0.6 0.0 - 1.5 %    Immature Grans % 0.5 0.0 - 0.5 %    Neutrophils, Absolute 6.00 1.70 - 7.00 10*3/mm3    Lymphocytes, Absolute 3.71 (H) 0.70 - 3.10 10*3/mm3    Monocytes, Absolute 1.02 (H) 0.10 - 0.90 10*3/mm3    Eosinophils, Absolute 0.21 0.00 - 0.40 10*3/mm3    Basophils, Absolute 0.07 0.00 - 0.20 10*3/mm3    Immature Grans, Absolute 0.06 (H) 0.00 - 0.05 10*3/mm3    nRBC 0.0 0.0 - 0.2 /100 WBC   Comprehensive Metabolic Panel    Collection Time: 07/05/25  4:41 AM    Specimen: Blood   Result Value Ref Range    Glucose 273 (H) 65 - 99 mg/dL    BUN 16.3 8.0 - 23.0 mg/dL    Creatinine 0.88 0.57 - 1.00 mg/dL     Sodium 140 136 - 145 mmol/L    Potassium 4.3 3.5 - 5.2 mmol/L    Chloride 101 98 - 107 mmol/L    CO2 29.0 22.0 - 29.0 mmol/L    Calcium 9.0 8.6 - 10.5 mg/dL    Total Protein 6.3 6.0 - 8.5 g/dL    Albumin 4.1 3.5 - 5.2 g/dL    ALT (SGPT) 21 1 - 33 U/L    AST (SGOT) 19 1 - 32 U/L    Alkaline Phosphatase 151 (H) 39 - 117 U/L    Total Bilirubin 0.4 0.0 - 1.2 mg/dL    Globulin 2.2 gm/dL    A/G Ratio 1.9 g/dL    BUN/Creatinine Ratio 18.5 7.0 - 25.0    Anion Gap 10.0 5.0 - 15.0 mmol/L    eGFR 72.6 >60.0 mL/min/1.73   POC Glucose Once    Collection Time: 07/05/25  4:48 AM    Specimen: Blood   Result Value Ref Range    Glucose 297 (H) 70 - 130 mg/dL     Note: In addition to lab results from this visit, the labs listed above may include labs taken at another facility or during a different encounter within the last 24 hours. Please correlate lab times with ED admission and discharge times for further clarification of the services performed during this visit.    XR Chest 1 View   Final Result   Impression:   No acute cardiopulmonary process         Electronically Signed: Jocelyn Rankin MD     7/5/2025 4:54 AM EDT     Workstation ID: [id number]        Vitals:    07/05/25 0449 07/05/25 0450 07/05/25 0451 07/05/25 0504   BP:    151/88   Pulse: 72 72 75 74   Resp:    18   Temp:       TempSrc:       SpO2:  98% 97% 95%   Weight:       Height:         Medications   Sodium Chloride (PF) 0.9 % 10 mL ( Intravenous MAR Hold 7/5/25 8554)   heparin 44832 units/250 mL (100 units/mL) in 0.45 % NaCl infusion (has no administration in time range)   Pharmacy to Dose Heparin (has no administration in time range)   midazolam (VERSED) injection (2 mg Intravenous Given 7/5/25 0508)   lidocaine PF 1% (XYLOCAINE) injection (0.5 mL Infiltration Given 7/5/25 0508)   heparin (porcine) injection (5,000 Units Intra-arterial Given 7/5/25 8647)   niCARdipine (CARDENE) syringe (200 mcg Intra-arterial Given 7/5/25 0509)   nitroglycerin 100 mcg/mL in D5W syringe  (200 mcg Intra-arterial Given 7/5/25 9689)   Cangrelor Tetrasodium (200 mcg/mL) (KENGREAL) bolus from bag (3,120 mcg Intravenous Given 7/5/25 0527)   cangrelor 50 mg in 250 ml (200 mcg/ml) IV infusion (4 mcg/kg/min × 104 kg Intravenous New Bag 7/5/25 0527)   heparin (porcine) injection 6,240 Units (6,240 Units Intravenous Given 7/5/25 2142)     ECG/EMG Results (last 24 hours)       Procedure Component Value Units Date/Time    ECG 12 Lead ED Triage Standing Order; Acute STEMI [[id number]] Collected: 07/05/25 0436     Updated: 07/05/25 0435     QT Interval 412 ms      QTC Interval 444 ms     Narrative:      Test Reason : ED Triage Standing Order~  Blood Pressure :   */*   mmHG  Vent. Rate :  70 BPM     Atrial Rate :  70 BPM     P-R Int : 170 ms          QRS Dur :  90 ms      QT Int : 412 ms       P-R-T Axes :  72  -1  56 degrees    QTcB Int : 444 ms    Normal sinus rhythm  Normal ECG  When compared with ECG of 30-Jun-2024 16:57,  No significant change was found    Referred By: ed md           Confirmed By:           ECG 12 Lead ED Triage Standing Order; Acute STEMI   Preliminary Result   Test Reason : ED Triage Standing Order~   Blood Pressure :   */*   mmHG   Vent. Rate :  70 BPM     Atrial Rate :  70 BPM      P-R Int : 170 ms          QRS Dur :  90 ms       QT Int : 412 ms       P-R-T Axes :  72  -1  56 degrees     QTcB Int : 444 ms      Normal sinus rhythm   Normal ECG   When compared with ECG of 30-Jun-2024 16:57,   No significant change was found      Referred By: ed md           Confirmed By:               Final diagnoses:   ST elevation myocardial infarction (STEMI), unspecified artery   Hyperglycemia due to diabetes mellitus   Tobacco use disorder       ED Disposition  ED Disposition       ED Disposition   Send to Cath Lab    Condition   --    Comment   --               No follow-up provider specified.       Medication List      No changes were made to your prescriptions during this visit.            Denise  Ashlie Vargas MD  07/05/25 0537

## 2025-07-05 NOTE — Clinical Note
First balloon inflation max pressure = 9 calderon. First balloon inflation duration = 15 seconds. Second inflation of balloon - Max pressure = 12 calderon. 2nd Inflation of balloon - Duration = 15 seconds.

## 2025-07-05 NOTE — Clinical Note
Allergies reviewed.  H&P note has been confirmed for the patient. Labs are pending for this patient.

## 2025-07-05 NOTE — Clinical Note
First balloon inflation max pressure = 6 calderon. First balloon inflation duration = 10 seconds. Second inflation of balloon - Max pressure = 12 calderon. 2nd Inflation of balloon - Duration = 15 seconds.

## 2025-07-05 NOTE — PLAN OF CARE
Goal Outcome Evaluation:      Pt arrived from cath lab this AM. Alert and oriented. RA, NSR. VSS. R radial site intact, air compression of 11 mL w/ no S/S of bleeding. Daughters at bedside. Bed in lowest position, call light within reach, and no other requests at this time. Care on-going.

## 2025-07-05 NOTE — CONSULTS
[facility] Medicine Services  CONSULT NOTE      Patient Name: Cori Moya  : 1960  MRN: [id number]    Primary Care Physician: Pepper Delarosa APRN  Provider requesting consultation: No ref. provider found    Subjective   Subjective     Reason for Consultation:  DM management    HPI:  Cori Moya is a 65 y.o. female with pmh significant for HTN, arthritis, IIH, DM2 who presented last evening with acute onset crushing chest pain. She called EMS and found to have ST elevation in field, activating cath lab. She has undergone LHC early this morning with OCTAVIANO and now reporting feeling very well without CP, soa, fatigue. Hospitalist consulted for uncontrolled DM.   Patient reports taking farxiga, metformin ER 500mg qd and ozempic x 2 years. No changes in meds per her provider, but she was running out of medication so decreased dose of Ozempic last month herself until she could get into PCP. Previous reported A1c around 6.5%, now 10.4%.      Review of Systems  Gen- No fevers, chills  CV- No chest pain, palpitations  Resp- No cough, dyspnea  GI- No N/V/D, abd pain    All other systems reviewed and are negative.     Personal History     Past Medical History:   Diagnosis Date    Cancer     Diabetes mellitus     Elevated cholesterol     Hypertension     Idiopathic intracranial hypertension        Past Surgical History:   Procedure Laterality Date    CARPAL TUNNEL RELEASE       SECTION      DENTAL PROCEDURE      OPTIC NERVE SHEATH FENESTRATIAN      POSTERIOR FOSSA DECOMPRESSION      TONSILLECTOMY      TUBAL ABDOMINAL LIGATION         Family History:  family history is not on file. Otherwise pertinent FHx was reviewed and unremarkable.     Social History:  reports that she has been smoking cigarettes. She started smoking about 46 years ago. She has a 69.1 pack-year smoking history. She has never used smokeless tobacco. She reports current alcohol use of about 1.0  standard drink of alcohol per week. Drug use questions deferred to the physician.    Medications:  Dapagliflozin Propanediol, ketorolac, levothyroxine, lisinopril, montelukast, and vitamin D3    Scheduled Meds:[START ON 7/6/2025] aspirin, 81 mg, Oral, Daily  carvedilol, 3.125 mg, Oral, BID With Meals  insulin glargine, 8 Units, Subcutaneous, Daily  insulin lispro, 2-9 Units, Subcutaneous, 4x Daily AC & at Bedtime  levothyroxine, 25 mcg, Oral, Q AM  losartan, 50 mg, Oral, Daily  montelukast, 10 mg, Oral, Daily  nicotine, 1 patch, Transdermal, Q24H  pharmacy consult - MTM, , Not Applicable, Daily  rosuvastatin, 20 mg, Oral, Nightly  ticagrelor, 90 mg, Oral, BID      Continuous Infusions:sodium chloride, 100 mL/hr, Last Rate: 100 mL/hr (07/05/25 0806)      PRN Meds:.  acetaminophen    ALPRAZolam    aluminum-magnesium hydroxide-simethicone    dextrose    dextrose    glucagon (human recombinant)    magnesium hydroxide    nicotine polacrilex    nitroglycerin    ondansetron ODT **OR** ondansetron    Sodium Chloride (PF)    Allergies   Allergen Reactions    Aspirin Other (See Comments)     Knots on legs  Tolerates ketorolac       Objective   Objective     Vital Signs:   Temp:  [97.9 °F (36.6 °C)-98.4 °F (36.9 °C)] 98 °F (36.7 °C)  Heart Rate:  [66-76] 71  Resp:  [16-18] 16  BP: (111-155)/(72-91) 152/89    Physical Exam  Constitutional: No acute distress, awake, alert  HENT: NCAT, mucous membranes moist  Respiratory: Clear to auscultation bilaterally, respiratory effort normal   Cardiovascular: RRR, no murmurs, rubs, or gallops  Gastrointestinal: Positive bowel sounds, soft, nontender, nondistended  Musculoskeletal: No bilateral ankle edema  Psychiatric: Appropriate affect, cooperative  Neurologic: Oriented x 3, strength symmetric in all extremities, Cranial Nerves grossly intact to confrontation, speech clear  Skin: No rashes         Result Review:  I have personally reviewed the results from the time of admission and agree  with these findings:  [x]  Laboratory  [x]  Radiology  [x]  EKG/Telemetry   []  Pathology  []  Old records  []  Other:  Most notable findings include:     LAB RESULTS:      Lab 07/05/25  0651 07/05/25  0441   WBC 10.90* 11.07*   HEMOGLOBIN 15.6 16.7*   HEMATOCRIT 46.2 48.7*   PLATELETS 182 173   NEUTROS ABS  --  6.00   IMMATURE GRANS (ABS)  --  0.06*   LYMPHS ABS  --  3.71*   MONOS ABS  --  1.02*   EOS ABS  --  0.21   MCV 86.5 88.1   PROTIME  --  13.2   HEPARIN ANTI-XA  --  0.10*         Lab 07/05/25  0651 07/05/25  0441   SODIUM 135* 140   POTASSIUM 4.2 4.3   CHLORIDE 100 101   CO2 25.0 29.0   ANION GAP 10.0 10.0   BUN 16.3 16.3   CREATININE 0.77 0.88   EGFR 85.2 72.6   GLUCOSE 282* 273*   CALCIUM 8.5* 9.0   MAGNESIUM  --  1.7   HEMOGLOBIN A1C 10.40*  --          Lab 07/05/25  0441   TOTAL PROTEIN 6.3   ALBUMIN 4.1   GLOBULIN 2.2   ALT (SGPT) 21   AST (SGOT) 19   BILIRUBIN 0.4   ALK PHOS 151*         Lab 07/05/25  1035 07/05/25  0651 07/05/25  0441   PROBNP  --   --  108.7   HSTROP T 349* 186* 41*   PROTIME  --   --  13.2   INR  --   --  0.95         Lab 07/05/25  0441   CHOLESTEROL 181   LDL CHOL 108*   HDL CHOL 27*   TRIGLYCERIDES 267*             Brief Urine Lab Results       None          Microbiology Results (last 10 days)       ** No results found for the last 240 hours. **            Cardiac Catheterization/Vascular Study  Result Date: 7/5/2025  FINAL     Impression: 95% mid to distal ulcerated stenosis of the infarct-related left anterior descending coronary artery. This vessel is now status post successful PTCA/placement of a 2.5 x 18 mm OCTAVIANO proximally optimized to 3.0 mm reducing the 95% stenosis to 0% Residual moderate nonobstructive disease of the RCA and the left circumflex coronary artery. Preserved left ventricular systolic function, estimated EF 60%. RECOMMENDATIONS: Dual antiplatelet therapy for 1 year. Optimize medical therapy and risk factor management per guidelines. Indications: ACS with field  STEMI activation. Access: Right radial. Procedures: Left heart catheterization. Left ventriculogram. Selective coronary angiography. PTCA/stenting of the LAD. Arterial site hemostasis with radial band. Procedure narrative: The patient was brought to the catheterization lab in a fasting condition.  Access site was prepped and draped in standard sterile fashion.  Lidocaine was injected and arterial access was obtained by percutaneous anterior wall puncture technique.  A 6 English arterial sheath was placed. Above procedures were performed without complications. Following the angiograms ACT was monitored.  Cangrelor was started.  CLS 3.0 guide catheter was used to complete the angiography.  The patient was noted to have a 95% ulcerated stenosis of the mid to distal LAD in addition to nonocclusive proximal plaque.  The lesion was started with a versa turn wire and predilated with a 2.0 mm balloon and then stented with a 2.5 x 18 mm OCTAVIANO which was fully deployed and then postdilated proximally with a 3.0 mm NC balloon with satisfactory expansion and 0% residual stenosis and no evidence of complications.  Nitroglycerin was injected and final angiograms were taken.  MARTIN flow was grade 3 before and after PCI.  At the conclusion the arterial sheath was removed and hemostasis was achieved.  The patient was transferred to the unit in a stable condition. Hemodynamic Findings: Heart Rate: 72/minute. LV pressure: 140s/10-24 mmHg, on pull back no gradient was recorded across the aortic valve. Angiographic Findings: Right coronary dominance. LM: Short vessel which immediately bifurcates into the LAD and the circumflex.  No significant disease is noted. LAD: Minor proximal irregularities and ectasia.  There is a 30 to 40% focal mid segment stenosis between the small first diagonal and larger second diagonal.  The first diagonal has 50% stenosis.  Immediately after the second diagonal the LAD has a focal 95% ulcerated appearing mid to  distal stenosis.  This was the culprit lesion which was successfully stented with satisfactory results and no significant residual disease. LCX: Proximal ectasia.  The large first obtuse marginal is a tortuous vessel and has a 40% proximal stenosis.  The mid circumflex has 30% stenosis after bifurcation from the obtuse marginal.  Distally circumflex bifurcates into the second marginal and then the distal circumflex.  The second marginal has 30% plaque.  The distal circumflex has diffuse 50% stenosis before giving off 2 small lateral branches. RCA: Dominant vessel with diffuse irregular plaque with multiple segments of 30 to 50% narrowings over a long proximal to mid segment.  There is a 30% distal stenosis before the origin of the right PDA.  The right PDA has a 30 to 40% proximal stenosis. LV: Left ventriculogram performed in 30 IRVING projection revealed mild apical hypokinesis with preserved overall systolic function and estimated ejection fraction of 60%. No mitral regurgitation was noted. Complications: No acute procedure related complications.     XR Chest 1 View  Result Date: 7/5/2025  XR CHEST 1 VW Date of Exam: 7/5/2025 4:38 AM EDT Indication: Acute STEMI traige protocol Comparison: 6/30/2024. Findings: There are no airspace consolidations. No pleural fluid. No pneumothorax. The pulmonary vasculature appears within normal limits. The cardiac and mediastinal silhouette appear unremarkable. No acute osseous abnormality identified.     Impression: Impression: No acute cardiopulmonary process Electronically Signed: Jocelyn Rankin MD  7/5/2025 4:54 AM EDT  Workstation ID: [id number]          Assessment & Plan   Assessment & Plan     Active Hospital Problems    Diagnosis  POA    **ACS (acute coronary syndrome) [I24.9]  Yes    Uncontrolled diabetes mellitus with hyperglycemia [E11.65]  Unknown    Tobacco abuse [Z72.0]  Unknown    Essential hypertension [I10]  Unknown      Resolved Hospital Problems   No resolved problems  to display.       ACS  -- trop -300  -- ST elevation in field per EMS, resolved on arrival  -- s/p heparin gtt  -- LHC 95% mid- distal ulcerated stenosis s/p OCTAVIANO reducing stenosis to 0%. Moderate nonobstructive disease of RCA and left circumflex. EF 60%  -- echo pending  -- DAPT, statin, carvedilol    HTN  -- losartan    Uncontrolled DM2  -- on farxiga, ozempic and metformin 500mg daily at home. Although has decreased ozempic dose x 1mo bc no refill  -- A1c 10.4%  -- holding home meds  -- lantus and MSSI for now  -- QID accuchecks    Tobacco abuse  -- cessation advised  -- patch      Thank you for allowing [facility] Medicine Service to provide consultative care for your patient, we will continue to follow while clinically appropriate.    JUNIOR Mckeon  07/05/25          Electronically signed by JUNIOR Mckeon, 07/05/25, 12:54 PM EDT.

## 2025-07-05 NOTE — PLAN OF CARE
Goal Outcome Evaluation:  Plan of Care Reviewed With: patient        Progress: improving  Outcome Evaluation: Patient A+Ox4, has denied chest pain or SOA on room air.   VSS, NSR via the monitor.  TR-Band removed per protocol.  Has had post IV hydration. 12 lead EKG and ECHO has been completed, see reports.  Trop elevated and called to providers, no new orders r/t elevated Trop is expected post heart cath per Dr. Abraham.  Patient currently up into bedside chair and has been independent in her room.  Pt reports the plan is to discharge home 7-6-25.  Will continue with current POC.

## 2025-07-06 LAB
GLUCOSE BLDC GLUCOMTR-MCNC: 236 MG/DL (ref 70–130)
GLUCOSE BLDC GLUCOMTR-MCNC: 239 MG/DL (ref 70–130)
GLUCOSE BLDC GLUCOMTR-MCNC: 253 MG/DL (ref 70–130)
GLUCOSE BLDC GLUCOMTR-MCNC: 375 MG/DL (ref 70–130)

## 2025-07-06 PROCEDURE — 63710000001 INSULIN GLARGINE PER 5 UNITS: Performed by: NURSE PRACTITIONER

## 2025-07-06 PROCEDURE — 63710000001 INSULIN LISPRO (HUMAN) PER 5 UNITS: Performed by: NURSE PRACTITIONER

## 2025-07-06 PROCEDURE — 82948 REAGENT STRIP/BLOOD GLUCOSE: CPT

## 2025-07-06 PROCEDURE — 99232 SBSQ HOSP IP/OBS MODERATE 35: CPT

## 2025-07-06 RX ORDER — METFORMIN HYDROCHLORIDE 500 MG/1
1000 TABLET, EXTENDED RELEASE ORAL
Qty: 60 TABLET | Refills: 0 | Status: SHIPPED | OUTPATIENT
Start: 2025-07-06

## 2025-07-06 RX ORDER — INSULIN LISPRO 100 [IU]/ML
8 INJECTION, SOLUTION INTRAVENOUS; SUBCUTANEOUS
Status: DISCONTINUED | OUTPATIENT
Start: 2025-07-06 | End: 2025-07-07

## 2025-07-06 RX ORDER — INSULIN LISPRO 100 [IU]/ML
3 INJECTION, SOLUTION INTRAVENOUS; SUBCUTANEOUS
Status: DISCONTINUED | OUTPATIENT
Start: 2025-07-06 | End: 2025-07-06

## 2025-07-06 RX ORDER — SEMAGLUTIDE 0.68 MG/ML
0.5 INJECTION, SOLUTION SUBCUTANEOUS WEEKLY
Start: 2025-07-06

## 2025-07-06 RX ORDER — INSULIN LISPRO 100 [IU]/ML
3-14 INJECTION, SOLUTION INTRAVENOUS; SUBCUTANEOUS
Status: DISCONTINUED | OUTPATIENT
Start: 2025-07-06 | End: 2025-07-07 | Stop reason: HOSPADM

## 2025-07-06 RX ORDER — CARVEDILOL 3.12 MG/1
3.12 TABLET ORAL 2 TIMES DAILY WITH MEALS
Qty: 60 TABLET | Refills: 11 | Status: CANCELLED | OUTPATIENT
Start: 2025-07-06

## 2025-07-06 RX ORDER — DAPAGLIFLOZIN 10 MG/1
10 TABLET, FILM COATED ORAL DAILY
Qty: 30 TABLET | Refills: 0 | Status: SHIPPED | OUTPATIENT
Start: 2025-07-06

## 2025-07-06 RX ADMIN — TICAGRELOR 90 MG: 90 TABLET ORAL at 08:33

## 2025-07-06 RX ADMIN — LOSARTAN POTASSIUM 50 MG: 50 TABLET, FILM COATED ORAL at 08:37

## 2025-07-06 RX ADMIN — ROSUVASTATIN 20 MG: 20 TABLET, FILM COATED ORAL at 21:37

## 2025-07-06 RX ADMIN — INSULIN LISPRO 8 UNITS: 100 INJECTION, SOLUTION INTRAVENOUS; SUBCUTANEOUS at 17:49

## 2025-07-06 RX ADMIN — CARVEDILOL 3.12 MG: 3.12 TABLET, FILM COATED ORAL at 08:37

## 2025-07-06 RX ADMIN — LEVOTHYROXINE SODIUM 25 MCG: 0.03 TABLET ORAL at 06:03

## 2025-07-06 RX ADMIN — CARVEDILOL 3.12 MG: 3.12 TABLET, FILM COATED ORAL at 17:48

## 2025-07-06 RX ADMIN — INSULIN LISPRO 6 UNITS: 100 INJECTION, SOLUTION INTRAVENOUS; SUBCUTANEOUS at 08:39

## 2025-07-06 RX ADMIN — ACETAMINOPHEN 650 MG: 325 TABLET, FILM COATED ORAL at 21:56

## 2025-07-06 RX ADMIN — INSULIN LISPRO 8 UNITS: 100 INJECTION, SOLUTION INTRAVENOUS; SUBCUTANEOUS at 12:39

## 2025-07-06 RX ADMIN — TICAGRELOR 90 MG: 90 TABLET ORAL at 21:37

## 2025-07-06 RX ADMIN — INSULIN LISPRO 5 UNITS: 100 INJECTION, SOLUTION INTRAVENOUS; SUBCUTANEOUS at 17:49

## 2025-07-06 RX ADMIN — ASPIRIN 81 MG: 81 TABLET, CHEWABLE ORAL at 08:37

## 2025-07-06 RX ADMIN — INSULIN GLARGINE 15 UNITS: 100 INJECTION, SOLUTION SUBCUTANEOUS at 08:38

## 2025-07-06 RX ADMIN — INSULIN LISPRO 3 UNITS: 100 INJECTION, SOLUTION INTRAVENOUS; SUBCUTANEOUS at 08:38

## 2025-07-06 RX ADMIN — MONTELUKAST 10 MG: 10 TABLET, FILM COATED ORAL at 08:37

## 2025-07-06 RX ADMIN — INSULIN LISPRO 5 UNITS: 100 INJECTION, SOLUTION INTRAVENOUS; SUBCUTANEOUS at 21:36

## 2025-07-06 NOTE — PROGRESS NOTES
"[facility] Cardiology  Hospital Progress Note     LOS: 1 day   Patient Care Team:  Pepper Delarosa APRN as PCP - General (Family Medicine)  PCP:  Pepper Delarosa APRN    Chief Complaint: STEMI    SUBJECTIVE: Up in chair. She would like to go home today. No chest pain or significant arrhythmias overnight.       OBJECTIVE:       Vital Sign Min/Max for last 24 hours  Temp  Min: 98 °F (36.7 °C)  Max: 98.4 °F (36.9 °C)   BP  Min: 103/83  Max: 152/89   Pulse  Min: 64  Max: 78   Resp  Min: 16  Max: 18   SpO2  Min: 95 %  Max: 97 %   No data recorded   Weight  Min: 109 kg (240 lb 4.8 oz)  Max: 109 kg (240 lb 4.8 oz)     Flowsheet Rows      Flowsheet Row First Filed Value   Admission Height 170.2 cm (67\") Documented at 07/05/2025 0431   Admission Weight 104 kg (230 lb) Documented at 07/05/2025 0431            Telemetry: SR      Intake/Output Summary (Last 24 hours) at 7/6/2025 0857  Last data filed at 7/5/2025 1758  Gross per 24 hour   Intake 480 ml   Output --   Net 480 ml     Intake & Output (last 3 days)         07/03 0701  07/04 0700 07/04 0701  07/05 0700 07/05 0701  07/06 0700 07/06 0701  07/07 0700    P.O.   720     I.V. (mL/kg)   500 (4.6)     Total Intake(mL/kg)   1220 (11.2)     Net   +1220             Urine Unmeasured Occurrence   2 x              Physical Exam:  Vitals reviewed.   Constitutional:       Appearance: Healthy appearance.   Neck:      Vascular: No JVD.   Pulmonary:      Effort: Pulmonary effort is normal.      Breath sounds: Normal breath sounds.   Cardiovascular:      Normal rate. Regular rhythm. Normal S1. Normal S2.       Murmurs: There is no murmur.      No gallop.  No rub.   Edema:     Peripheral edema absent.   Skin:     General: Skin is warm and dry.      Comments: Right radial dressing CDI, non tender, no drainage.   Neurological:      General: No focal deficit present.      Mental Status: Alert and oriented to person, place and time.          LABS/DIAGNOSTIC " DATA:  Results from last 7 days   Lab Units 07/05/25  0651 07/05/25  0441   WBC 10*3/mm3 10.90* 11.07*   HEMOGLOBIN g/dL 15.6 16.7*   HEMATOCRIT % 46.2 48.7*   PLATELETS 10*3/mm3 182 173     Lab Results   Lab Value Date/Time    TROPONINT 349 (C) 07/05/2025 1035    TROPONINT 186 (C) 07/05/2025 0651    TROPONINT 41 (H) 07/05/2025 0441    TROPONINT 23 (H) 06/30/2024 1703    TROPONINT 23 (H) 06/30/2024 1446    TROPONINT 20 (H) 05/11/2023 1831    TROPONINT 17 (H) 05/11/2023 1700     Results from last 7 days   Lab Units 07/05/25  0441   INR  0.95     Results from last 7 days   Lab Units 07/05/25  0651 07/05/25  0441   SODIUM mmol/L 135* 140   POTASSIUM mmol/L 4.2 4.3   CHLORIDE mmol/L 100 101   CO2 mmol/L 25.0 29.0   BUN mg/dL 16.3 16.3   CREATININE mg/dL 0.77 0.88   CALCIUM mg/dL 8.5* 9.0   BILIRUBIN mg/dL  --  0.4   ALK PHOS U/L  --  151*   ALT (SGPT) U/L  --  21   AST (SGOT) U/L  --  19   GLUCOSE mg/dL 282* 273*     Results from last 7 days   Lab Units 07/05/25  0651   HEMOGLOBIN A1C % 10.40*     Results from last 7 days   Lab Units 07/05/25  0441   CHOLESTEROL mg/dL 181   TRIGLYCERIDES mg/dL 267*   HDL CHOL mg/dL 27*   LDL CHOL mg/dL 108*       Results for orders placed during the hospital encounter of 07/05/25  Cardiac Catheterization/Vascular Study  Impression  95% mid to distal ulcerated stenosis of the infarct-related left anterior descending coronary artery.  This vessel is now status post successful PTCA/placement of a 2.5 x 18 mm OCTAVIANO proximally optimized to 3.0 mm reducing the 95% stenosis to 0%  Residual moderate nonobstructive disease of the RCA and the left circumflex coronary artery.  Preserved left ventricular systolic function, estimated EF 60%.    RECOMMENDATIONS:  Dual antiplatelet therapy for 1 year.  Optimize medical therapy and risk factor management per guidelines.              Medication Review:   aspirin, 81 mg, Oral, Daily  carvedilol, 3.125 mg, Oral, BID With Meals  insulin glargine, 15 Units,  Subcutaneous, Daily  insulin lispro, 2-9 Units, Subcutaneous, 4x Daily AC & at Bedtime  Insulin Lispro, 3 Units, Subcutaneous, TID With Meals  levothyroxine, 25 mcg, Oral, Q AM  losartan, 50 mg, Oral, Daily  montelukast, 10 mg, Oral, Daily  nicotine, 1 patch, Transdermal, Q24H  pharmacy consult - MTM, , Not Applicable, Daily  rosuvastatin, 20 mg, Oral, Nightly  ticagrelor, 90 mg, Oral, BID               ASSESSMENT:  ACS/acute anterior ST elevation MI based on symptoms and field EKG, these have now resolved suggesting spontaneously aborted STEMI.  LHC: S/p PTCA/OCTAVIANO to LAD, moderate RCA stenosis, EF 60%  Echo: EF 60%, no significant valvular abnormalities  Hypertension.  Type 2 diabetes, A1C 10.4  Reports she has been out of Ozempic and Farxiga, has not followed up with PCP  Dyslipidemia,  (no statin PTA)  Chronic heavy smoker.  Obesity.      PLAN:  Dual antiplatelet for one year   Target BP < 130/80, on carvedilol, losartan  Target LDL < 55, rosuvastatin started  Target A1c < 7, on insulin  Smoking cessation  Monitor one more day, home tomorrow - she is adamant about going home today. Will discuss with MD. Reviewed high-risk for arrhythmias/complications post-MI.      JUNIOR Doan   07/06/25  08:57 EDT

## 2025-07-06 NOTE — PROGRESS NOTES
[facility] Medicine Services  PROGRESS NOTE    Patient Name: Cori Moya  : 1960  MRN: [id number]    Date of Admission: 2025  Primary Care Physician: Pepper Delarosa APRN    Subjective   Subjective     CC:  Medical/ DM management    HPI:  Patient up in chair eating lunch. Feels great still. No overnight issues      Objective   Objective     Vital Signs:   Temp:  [98 °F (36.7 °C)-98.4 °F (36.9 °C)] 98.4 °F (36.9 °C)  Heart Rate:  [64-78] 69  Resp:  [16-18] 18  BP: (103-155)/(64-89) 115/64     Physical Exam:  Constitutional: No acute distress, awake, alert  HENT: NCAT, mucous membranes moist  Respiratory: Clear to auscultation bilaterally, respiratory effort normal   Cardiovascular: RRR, no murmurs, rubs, or gallops  Gastrointestinal: Positive bowel sounds, soft, nontender, nondistended  Musculoskeletal: No bilateral ankle edema  Psychiatric: Appropriate affect, cooperative  Neurologic: Oriented x 3, strength symmetric in all extremities, Cranial Nerves grossly intact to confrontation, speech clear  Skin: No rashes      Results Reviewed:  LAB RESULTS:      Lab 25  1035 25  0651 25  0441   WBC  --  10.90* 11.07*   HEMOGLOBIN  --  15.6 16.7*   HEMATOCRIT  --  46.2 48.7*   PLATELETS  --  182 173   NEUTROS ABS  --   --  6.00   IMMATURE GRANS (ABS)  --   --  0.06*   LYMPHS ABS  --   --  3.71*   MONOS ABS  --   --  1.02*   EOS ABS  --   --  0.21   MCV  --  86.5 88.1   PROTIME  --   --  13.2   HEPARIN ANTI-XA  --   --  0.10*   HSTROP T 349* 186* 41*         Lab 25  0651 25  0441   SODIUM 135* 140   POTASSIUM 4.2 4.3   CHLORIDE 100 101   CO2 25.0 29.0   ANION GAP 10.0 10.0   BUN 16.3 16.3   CREATININE 0.77 0.88   EGFR 85.2 72.6   GLUCOSE 282* 273*   CALCIUM 8.5* 9.0   MAGNESIUM  --  1.7   HEMOGLOBIN A1C 10.40*  --          Lab 25  0441   TOTAL PROTEIN 6.3   ALBUMIN 4.1   GLOBULIN 2.2   ALT (SGPT) 21   AST (SGOT) 19   BILIRUBIN 0.4   ALK  PHOS 151*         Lab 07/05/25  1035 07/05/25  0651 07/05/25  0441   PROBNP  --   --  108.7   HSTROP T 349* 186* 41*   PROTIME  --   --  13.2   INR  --   --  0.95         Lab 07/05/25  0441   CHOLESTEROL 181   LDL CHOL 108*   HDL CHOL 27*   TRIGLYCERIDES 267*             Brief Urine Lab Results       None            Microbiology Results Abnormal       None            Cardiac Catheterization/Vascular Study  Result Date: 7/5/2025  FINAL     Impression: 95% mid to distal ulcerated stenosis of the infarct-related left anterior descending coronary artery. This vessel is now status post successful PTCA/placement of a 2.5 x 18 mm OCTAVIANO proximally optimized to 3.0 mm reducing the 95% stenosis to 0% Residual moderate nonobstructive disease of the RCA and the left circumflex coronary artery. Preserved left ventricular systolic function, estimated EF 60%. RECOMMENDATIONS: Dual antiplatelet therapy for 1 year. Optimize medical therapy and risk factor management per guidelines. Indications: ACS with field STEMI activation. Access: Right radial. Procedures: Left heart catheterization. Left ventriculogram. Selective coronary angiography. PTCA/stenting of the LAD. Arterial site hemostasis with radial band. Procedure narrative: The patient was brought to the catheterization lab in a fasting condition.  Access site was prepped and draped in standard sterile fashion.  Lidocaine was injected and arterial access was obtained by percutaneous anterior wall puncture technique.  A 6 Mont[place] arterial sheath was placed. Above procedures were performed without complications. Following the angiograms ACT was monitored.  Cangrelor was started.  CLS 3.0 guide catheter was used to complete the angiography.  The patient was noted to have a 95% ulcerated stenosis of the mid to distal LAD in addition to nonocclusive proximal plaque.  The lesion was started with a versa turn wire and predilated with a 2.0 mm balloon and then stented with a 2.5 x 18 mm  OCTAVIANO which was fully deployed and then postdilated proximally with a 3.0 mm NC balloon with satisfactory expansion and 0% residual stenosis and no evidence of complications.  Nitroglycerin was injected and final angiograms were taken.  MARTIN flow was grade 3 before and after PCI.  At the conclusion the arterial sheath was removed and hemostasis was achieved.  The patient was transferred to the unit in a stable condition. Hemodynamic Findings: Heart Rate: 72/minute. LV pressure: 140s/10-24 mmHg, on pull back no gradient was recorded across the aortic valve. Angiographic Findings: Right coronary dominance. LM: Short vessel which immediately bifurcates into the LAD and the circumflex.  No significant disease is noted. LAD: Minor proximal irregularities and ectasia.  There is a 30 to 40% focal mid segment stenosis between the small first diagonal and larger second diagonal.  The first diagonal has 50% stenosis.  Immediately after the second diagonal the LAD has a focal 95% ulcerated appearing mid to distal stenosis.  This was the culprit lesion which was successfully stented with satisfactory results and no significant residual disease. LCX: Proximal ectasia.  The large first obtuse marginal is a tortuous vessel and has a 40% proximal stenosis.  The mid circumflex has 30% stenosis after bifurcation from the obtuse marginal.  Distally circumflex bifurcates into the second marginal and then the distal circumflex.  The second marginal has 30% plaque.  The distal circumflex has diffuse 50% stenosis before giving off 2 small lateral branches. RCA: Dominant vessel with diffuse irregular plaque with multiple segments of 30 to 50% narrowings over a long proximal to mid segment.  There is a 30% distal stenosis before the origin of the right PDA.  The right PDA has a 30 to 40% proximal stenosis. LV: Left ventriculogram performed in 30 IRVING projection revealed mild apical hypokinesis with preserved overall systolic function and  estimated ejection fraction of 60%. No mitral regurgitation was noted. Complications: No acute procedure related complications.     XR Chest 1 View  Result Date: 7/5/2025  XR CHEST 1 VW Date of Exam: 7/5/2025 4:38 AM EDT Indication: Acute STEMI traige protocol Comparison: 6/30/2024. Findings: There are no airspace consolidations. No pleural fluid. No pneumothorax. The pulmonary vasculature appears within normal limits. The cardiac and mediastinal silhouette appear unremarkable. No acute osseous abnormality identified.     Impression: Impression: No acute cardiopulmonary process Electronically Signed: Jocelyn Rankin MD  7/5/2025 4:54 AM EDT  Workstation ID: [id number]      Results for orders placed during the hospital encounter of 07/05/25    Transthoracic Echo Complete With Contrast if Necessary Per Protocol 07/05/2025  1:03 PM    Interpretation Summary    Left ventricular systolic function is normal. Estimated left ventricular EF = 60%    Left ventricular wall thickness is consistent with mild concentric hypertrophy.    Left ventricular diastolic function was normal.    Trace mitral and tricuspid regurgitation.      Current medications:  Scheduled Meds:aspirin, 81 mg, Oral, Daily  carvedilol, 3.125 mg, Oral, BID With Meals  insulin glargine, 15 Units, Subcutaneous, Daily  insulin lispro, 2-9 Units, Subcutaneous, 4x Daily AC & at Bedtime  Insulin Lispro, 3 Units, Subcutaneous, TID With Meals  levothyroxine, 25 mcg, Oral, Q AM  losartan, 50 mg, Oral, Daily  montelukast, 10 mg, Oral, Daily  nicotine, 1 patch, Transdermal, Q24H  pharmacy consult - MTM, , Not Applicable, Daily  rosuvastatin, 20 mg, Oral, Nightly  ticagrelor, 90 mg, Oral, BID      Continuous Infusions:   PRN Meds:.  acetaminophen    ALPRAZolam    aluminum-magnesium hydroxide-simethicone    dextrose    dextrose    glucagon (human recombinant)    magnesium hydroxide    nicotine polacrilex    nitroglycerin    ondansetron ODT **OR** ondansetron    Sodium  Chloride (PF)    Assessment & Plan   Assessment & Plan     Active Hospital Problems    Diagnosis  POA    **ACS (acute coronary syndrome) [I24.9]  Yes    Uncontrolled diabetes mellitus with hyperglycemia [E11.65]  Unknown    Tobacco abuse [Z72.0]  Unknown    Essential hypertension [I10]  Unknown      Resolved Hospital Problems   No resolved problems to display.        Brief Hospital Course to date:  Cori Moya is a 65 y.o. female  with pmh significant for HTN, arthritis, IIH, DM2 who presented last evening with acute onset crushing chest pain. She called EMS and found to have ST elevation in field, activating cath lab. She has undergone LHC with successful OCTAVIANO and now reporting feeling very well without CP, soa, fatigue. Hospitalist consulted for uncontrolled DM.     ACS  -- trop -300  -- ST elevation in field per EMS, resolved on arrival  -- s/p heparin gtt  -- LHC 95% mid- distal ulcerated stenosis s/p OCTAVIANO reducing stenosis to 0%. Moderate nonobstructive disease of RCA and left circumflex. EF 60%  -- echo normal EF, mild LVH  -- DAPT, statin, carvedilol     HTN  -- losartan     Uncontrolled DM2  -- on farxiga 5mg, ozempic and metformin 500mg daily at home. Although has decreased ozempic dose x 1mo bc no refills  -- A1c 10.4%  -- holding home meds here, likely could use insulin but has been fairly noncompliant with DM medications in the recent past and states she does not want insulin at this time.  -- lantus, prandial and MSSI while inpatient  -- QID accuchecks  -- RX for DC: increase metformin ER 1000mg daily; farxiga 10mg daily. Continue ozempic- follow up with PCP this week as scheduled for increased dosing and refills     Tobacco abuse  -- cessation advised  -- patch        Thank you for allowing [facility] Medicine Service to provide consultative care for your patient, we will continue to follow while clinically appropriate.       Expected Discharge Location and Transportation: home per  primary  Expected Discharge 7/7  Expected discharge date/ time has not been documented.     VTE Prophylaxis:  No VTE prophylaxis order currently exists.         AM-PAC 6 Clicks Score (PT): 24 (07/05/25 6676)    CODE STATUS:   Code Status and Medical Interventions: CPR (Attempt to Resuscitate); Full Support   Ordered at: 07/05/25 0634     Code Status (Patient has no pulse and is not breathing):    CPR (Attempt to Resuscitate)     Medical Interventions (Patient has pulse or is breathing):    Full Support       Michelle Estevez, JUNIOR  07/06/25

## 2025-07-07 ENCOUNTER — TRANSCRIBE ORDERS (OUTPATIENT)
Dept: CARDIAC REHAB | Facility: HOSPITAL | Age: 65
End: 2025-07-07
Payer: MEDICARE

## 2025-07-07 VITALS
TEMPERATURE: 98.5 F | RESPIRATION RATE: 18 BRPM | BODY MASS INDEX: 37.72 KG/M2 | SYSTOLIC BLOOD PRESSURE: 126 MMHG | HEART RATE: 66 BPM | WEIGHT: 240.3 LBS | OXYGEN SATURATION: 98 % | HEIGHT: 67 IN | DIASTOLIC BLOOD PRESSURE: 57 MMHG

## 2025-07-07 DIAGNOSIS — Z95.5 STENTED CORONARY ARTERY: Primary | ICD-10-CM

## 2025-07-07 LAB
ANION GAP SERPL CALCULATED.3IONS-SCNC: 10.3 MMOL/L (ref 5–15)
BUN SERPL-MCNC: 13.5 MG/DL (ref 8–23)
BUN/CREAT SERPL: 20.1 (ref 7–25)
CALCIUM SPEC-SCNC: 8.5 MG/DL (ref 8.6–10.5)
CHLORIDE SERPL-SCNC: 106 MMOL/L (ref 98–107)
CO2 SERPL-SCNC: 22.7 MMOL/L (ref 22–29)
CREAT SERPL-MCNC: 0.67 MG/DL (ref 0.57–1)
EGFRCR SERPLBLD CKD-EPI 2021: 96.5 ML/MIN/1.73
GLUCOSE BLDC GLUCOMTR-MCNC: 190 MG/DL (ref 70–130)
GLUCOSE SERPL-MCNC: 177 MG/DL (ref 65–99)
POTASSIUM SERPL-SCNC: 4.4 MMOL/L (ref 3.5–5.2)
SODIUM SERPL-SCNC: 139 MMOL/L (ref 136–145)

## 2025-07-07 PROCEDURE — 63710000001 INSULIN GLARGINE PER 5 UNITS: Performed by: NURSE PRACTITIONER

## 2025-07-07 PROCEDURE — 63710000001 INSULIN LISPRO (HUMAN) PER 5 UNITS: Performed by: NURSE PRACTITIONER

## 2025-07-07 PROCEDURE — 82948 REAGENT STRIP/BLOOD GLUCOSE: CPT

## 2025-07-07 PROCEDURE — 80048 BASIC METABOLIC PNL TOTAL CA: CPT

## 2025-07-07 PROCEDURE — 99232 SBSQ HOSP IP/OBS MODERATE 35: CPT | Performed by: INTERNAL MEDICINE

## 2025-07-07 RX ORDER — NITROGLYCERIN 0.4 MG/1
0.4 TABLET SUBLINGUAL
Qty: 25 TABLET | Refills: 1 | Status: SHIPPED | OUTPATIENT
Start: 2025-07-07

## 2025-07-07 RX ORDER — CARVEDILOL 6.25 MG/1
6.25 TABLET ORAL 2 TIMES DAILY WITH MEALS
Status: DISCONTINUED | OUTPATIENT
Start: 2025-07-07 | End: 2025-07-07 | Stop reason: HOSPADM

## 2025-07-07 RX ORDER — ASPIRIN 81 MG/1
81 TABLET ORAL DAILY
Qty: 100 TABLET | Refills: 3
Start: 2025-07-07

## 2025-07-07 RX ORDER — ROSUVASTATIN CALCIUM 20 MG/1
20 TABLET, COATED ORAL NIGHTLY
Qty: 30 TABLET | Refills: 3 | Status: SHIPPED | OUTPATIENT
Start: 2025-07-07

## 2025-07-07 RX ORDER — NITROGLYCERIN 0.4 MG/1
0.4 TABLET SUBLINGUAL
Qty: 25 TABLET | Refills: 3 | Status: CANCELLED | OUTPATIENT
Start: 2025-07-07

## 2025-07-07 RX ORDER — LOSARTAN POTASSIUM 50 MG/1
50 TABLET ORAL NIGHTLY
Qty: 30 TABLET | Refills: 3 | Status: SHIPPED | OUTPATIENT
Start: 2025-07-07

## 2025-07-07 RX ORDER — TICAGRELOR 90 MG/1
90 TABLET, FILM COATED ORAL 2 TIMES DAILY
Qty: 180 TABLET | Refills: 3 | Status: SHIPPED | OUTPATIENT
Start: 2025-07-07

## 2025-07-07 RX ORDER — INSULIN LISPRO 100 [IU]/ML
10 INJECTION, SOLUTION INTRAVENOUS; SUBCUTANEOUS
Status: DISCONTINUED | OUTPATIENT
Start: 2025-07-07 | End: 2025-07-07 | Stop reason: HOSPADM

## 2025-07-07 RX ORDER — CARVEDILOL 6.25 MG/1
6.25 TABLET ORAL 2 TIMES DAILY WITH MEALS
Qty: 60 TABLET | Refills: 3 | Status: SHIPPED | OUTPATIENT
Start: 2025-07-07

## 2025-07-07 RX ADMIN — INSULIN LISPRO 10 UNITS: 100 INJECTION, SOLUTION INTRAVENOUS; SUBCUTANEOUS at 08:54

## 2025-07-07 RX ADMIN — TICAGRELOR 90 MG: 90 TABLET ORAL at 08:47

## 2025-07-07 RX ADMIN — INSULIN GLARGINE 17 UNITS: 100 INJECTION, SOLUTION SUBCUTANEOUS at 08:54

## 2025-07-07 RX ADMIN — ASPIRIN 81 MG: 81 TABLET, CHEWABLE ORAL at 08:47

## 2025-07-07 RX ADMIN — INSULIN LISPRO 3 UNITS: 100 INJECTION, SOLUTION INTRAVENOUS; SUBCUTANEOUS at 08:53

## 2025-07-07 RX ADMIN — MONTELUKAST 10 MG: 10 TABLET, FILM COATED ORAL at 08:47

## 2025-07-07 RX ADMIN — LEVOTHYROXINE SODIUM 25 MCG: 0.03 TABLET ORAL at 05:20

## 2025-07-07 NOTE — CASE MANAGEMENT/SOCIAL WORK
Discharge Planning Assessment  [facility]     Patient Name: Cori Moya  MRN: [id number]  Today's Date: 7/7/2025    Admit Date: 7/5/2025    Plan: Home   Discharge Needs Assessment       Row Name 07/07/25 0955       Living Environment    People in Home alone    Current Living Arrangements home    Potentially Unsafe Housing Conditions none    In the past 12 months has the electric, gas, oil, or water company threatened to shut off services in your home? No    Primary Care Provided by self    Provides Primary Care For no one    Family Caregiver if Needed child(saúl), adult    Family Caregiver Names Zoe Meyer (daughter) [phone]    Quality of Family Relationships unable to assess    Able to Return to Prior Arrangements yes       Resource/Environmental Concerns    Resource/Environmental Concerns none    Transportation Concerns none       Transportation Needs    In the past 12 months, has lack of transportation kept you from medical appointments or from getting medications? no    In the past 12 months, has lack of transportation kept you from meetings, work, or from getting things needed for daily living? No       Food Insecurity    Within the past 12 months, you worried that your food would run out before you got the money to buy more. Never true    Within the past 12 months, the food you bought just didn't last and you didn't have money to get more. Never true       Transition Planning    Patient/Family Anticipates Transition to home    Patient/Family Anticipated Services at Transition none    Transportation Anticipated family or friend will provide       Discharge Needs Assessment    Readmission Within the Last 30 Days no previous admission in last 30 days    Equipment Currently Used at Home grab bar;glucometer    Concerns to be Addressed no discharge needs identified    Do you want help finding or keeping work or a job? I do not need or want help    Do you want help with school or training? For example,  starting or completing job training or getting a high school diploma, GED or equivalent No    Anticipated Changes Related to Illness none    Equipment Needed After Discharge grab bar, tub/shower;glucometer    Current Discharge Risk lives alone    Discharge Coordination/Progress I spoke with Pt, in room, to initiate discharge plan. Pt is admitted with ACS. She lives alone in a one story home in [facility]. There are 3 steps at the entrance of the home. Address on file was verified. Prior to admission, she ambulated independently and was independent with ADLs. She has the following DME: grab bars and glucometer. Her PCP is Pepper PACHECO. She has [employer] Medicare insurance which has Rx coverage. She uses [facility]. She states prescriptions are affordable. She denies HH/O2/OPPT. She is being discharged home today. She is on room air. Her sister-in-law will provide transportation via private vehicle. No discharge needs identified.                   Discharge Plan       Row Name 07/07/25 0958       Plan    Plan Home    Final Discharge Disposition Code 01 - home or self-care                  Continued Care and Services - Admitted Since 7/5/2025    No active coordination exists.       Expected Discharge Date and Time       Expected Discharge Date Expected Discharge Time    Jul 7, 2025            Demographic Summary       Row Name 07/07/25 0954       General Information    Arrived From home    Referral Source emergency department    Reason for Consult discharge planning    Preferred Language English    General Information Comments PCP Pepper PACHECO       Contact Information    Permission Granted to Share Info With     Contact Information Obtained for     Contact Information Comments Zoe Meyer (daughter) [phone]                   Functional Status       Row Name 07/07/25 0954       Functional Status    Usual Activity Tolerance good    Current  Activity Tolerance good       Functional Status, IADL    Medications independent    Meal Preparation independent    Housekeeping independent    Laundry independent    Shopping independent                   Psychosocial    No documentation.                  Abuse/Neglect    No documentation.                  Legal    No documentation.                  Substance Abuse    No documentation.                  Patient Forms    No documentation.                     Gin Horne RN

## 2025-07-07 NOTE — PROGRESS NOTES
"[facility]  IP Progress Note      Chief Complaint: Follow-up of acute MI/CAD    Subjective   Feels very well, has been ambulating without difficulty.  No chest pain or shortness of breath.  Wishes to go home.  States that she has not smoked since she came to the hospital and has not required any nicotine patch either.    Objective     Blood pressure 140/83, pulse 66, temperature 97.9 °F (36.6 °C), temperature source Oral, resp. rate 18, height 170.2 cm (67.01\"), weight 109 kg (240 lb 4.8 oz), SpO2 98%.     Intake/Output Summary (Last 24 hours) at 7/7/2025 0701  Last data filed at 7/6/2025 0930  Gross per 24 hour   Intake 690 ml   Output --   Net 690 ml       Physical Exam:  General: No acute distress.   Neck: no JVD.  Chest:No respiratory distress, breath sounds are normal. No wheezes,  rhonchi or rales.  Cardiovascular: Normal S1 and S2, no murmur, gallop or rub.    Extremities: No edema.  Right wrist stable, no bleeding or hematoma.    Results Review:     I reviewed the patient's new clinical results.    Results from last 7 days   Lab Units 07/05/25  0651   WBC 10*3/mm3 10.90*   HEMOGLOBIN g/dL 15.6   HEMATOCRIT % 46.2   PLATELETS 10*3/mm3 182     Results from last 7 days   Lab Units 07/07/25  0500 07/05/25  0651 07/05/25  0441   SODIUM mmol/L 139   < > 140   POTASSIUM mmol/L 4.4   < > 4.3   CHLORIDE mmol/L 106   < > 101   CO2 mmol/L 22.7   < > 29.0   BUN mg/dL 13.5   < > 16.3   CREATININE mg/dL 0.67   < > 0.88   CALCIUM mg/dL 8.5*   < > 9.0   BILIRUBIN mg/dL  --   --  0.4   ALK PHOS U/L  --   --  151*   ALT (SGPT) U/L  --   --  21   AST (SGOT) U/L  --   --  19   GLUCOSE mg/dL 177*   < > 273*    < > = values in this interval not displayed.     Results from last 7 days   Lab Units 07/07/25  0500   SODIUM mmol/L 139   POTASSIUM mmol/L 4.4   CHLORIDE mmol/L 106   CO2 mmol/L 22.7   BUN mg/dL 13.5   CREATININE mg/dL 0.67   GLUCOSE mg/dL 177*   CALCIUM mg/dL 8.5*     Results from " last 7 days   Lab Units 07/05/25  0441   INR  0.95     Lab Results   Component Value Date    TROPONINT 349 (C) 07/05/2025         Results from last 7 days   Lab Units 07/05/25  0441   CHOLESTEROL mg/dL 181   TRIGLYCERIDES mg/dL 267*   HDL CHOL mg/dL 27*   LDL CHOL mg/dL 108*     aspirin, 81 mg, Oral, Daily  carvedilol, 6.25 mg, Oral, BID With Meals  insulin glargine, 17 Units, Subcutaneous, Daily  Insulin Lispro, 10 Units, Subcutaneous, TID With Meals  insulin lispro, 3-14 Units, Subcutaneous, 4x Daily AC & at Bedtime  levothyroxine, 25 mcg, Oral, Q AM  losartan, 50 mg, Oral, Daily  montelukast, 10 mg, Oral, Daily  nicotine, 1 patch, Transdermal, Q24H  pharmacy consult - MTM, , Not Applicable, Daily  rosuvastatin, 20 mg, Oral, Nightly  ticagrelor, 90 mg, Oral, BID       Tele: Sinus Rythym      Assessment:  ACS/acute anterior MI.  CAD, status post LAD stent, residual moderate nonobstructive disease of the circumflex and the RCA.  EF 60%.  Hypertension.  Dyslipidemia.  Type 2 diabetes.  Uncontrolled, A1c 10.4 this admission.  Obesity.  Cigarette smoker.  Plan:  Continue current dual antiplatelet therapy.  Continue Crestor and current dose of losartan.  Increase carvedilol to 6.25 mg twice daily.  Management of diabetes per hospitalist.  Patient strongly encouraged to remain off cigarettes never smoke again.  She will be enrolled in cardiac rehab.  Overall condition is stable, we will discharge to home, follow-up with me in 6 weeks.    John Scales MD, FACC, [facility]

## 2025-07-07 NOTE — PAYOR COMM NOTE
"Cori Leon (65 y.o. Female)       Date of Birth   1960    Social Security Number       Address   [address] [address]    Home Phone   [phone]    MRN   [id number]       Jew   Congregation    Marital Status   Single                            Admission Date   2025    Admission Type   Emergency    Admitting Provider   John Scales MD    Attending Provider       Department, Room/Bed   [facility] 6A, N623/1       Discharge Date   2025    Discharge Disposition   Home or Self Care    Discharge Destination                                 Attending Provider: (none)   Allergies: Aspirin    Isolation: None   Infection: None   Code Status: CPR    Ht: 170.2 cm (67.01\")   Wt: 109 kg (240 lb 4.8 oz)    Admission Cmt: None   Principal Problem: ACS (acute coronary syndrome) [I24.9]                   Active Insurance as of 2025       Primary Coverage       Payor Plan Insurance Group Employer/Plan Group    AETNA MEDICARE REPLACEMENT AETNA MEDICARE ADVANTAGE HMO [id number]       Payor Plan Address Payor Plan Phone Number Payor Plan Fax Number Effective Dates    [address] [phone]  2024 - None Entered    [facility] [place] [address]         Subscriber Name Subscriber Birth Date Member ID       CORI LEON 1960 [id number]                     Emergency Contacts        (Rel.) Home Phone Work Phone Mobile Phone    Zoe Meyer (Daughter) -- -- [phone]                 History & Physical        John Scales MD at 25 0456            Date of Hospital Visit: 25  Encounter Provider: John Scales MD  Place of Service: [facility]  Patient Name: Cori Leon  :1960  Referral Provider: No ref. provider found  Primary Care Provider: Pepper Delarosa APRN    Chief complaint: Chest pain/field STEMI activation    History of Present Illness:  The patient is a 65-year-old female with past medical history of " hypertension, diabetes and dyslipidemia.  She is a long-term smoker of more than 1 pack/day.  She has no prior cardiac history.  Patient states that she was in her usual state of health yesterday.  She was outdoors all day long and felt dehydrated towards the end of the evening.  States that she drank a lot of Gatorade.  At around 1030 last night she started experiencing bilateral arm discomfort and some jaw discomfort which was intermittent.  She was finally able to put herself to sleep despite the ongoing discomfort.  She awakened from sleep at around 3:30 AM this morning with severe crushing anterior chest pain with jaw pain and arm pain.  At this point she became concerned and called the ambulance.  Field EKG showed acute anterior ST elevations.  She was given nitroglycerin and aspirin and was brought to the emergency department.  Upon arrival here her symptoms had almost completely resolved and repeat EKG showed resolution of ST segment elevations.  At the time of my evaluation in the ER she is denying any current chest pain shortness of breath arm pain or jaw pain.  Review of systems is remarkable for chronic mild edema which improves with leg elevation.  She is denying any orthopnea or PND.  No palpitations dizziness lightheadedness or syncope.    Past Medical History:   Diagnosis Date    Idiopathic intracranial hypertension/history of pseudotumor cerebri/status post craniotomy        Past Surgical History:   Procedure Laterality Date    CARPAL TUNNEL RELEASE       SECTION      DENTAL PROCEDURE      OPTIC NERVE SHEATH FENESTRATIAN      POSTERIOR FOSSA DECOMPRESSION      TONSILLECTOMY      TUBAL ABDOMINAL LIGATION         Medications Prior to Admission   Medication Sig Dispense Refill Last Dose/Taking    Dapagliflozin Propanediol (FARXIGA PO) Take  by mouth.       ketorolac (TORADOL) 10 MG tablet Take 1 tablet by mouth Every 6 (Six) Hours As Needed for Moderate Pain. 12 tablet 0     levothyroxine  "(SYNTHROID, LEVOTHROID) 25 MCG tablet TAKE ONE TABLET BY MOUTH EVERY DAY IN THE MORNING ON AN EMPTY STOMACH WITH COOL WATER. EAT ONE HOUR LATER       lisinopril (PRINIVIL,ZESTRIL) 20 MG tablet Take 1 tablet by mouth Every 12 (Twelve) Hours.       montelukast (Singulair) 10 MG tablet Take 1 tablet by mouth.       sodium-potassium-magnesium sulfates (Suprep Bowel Prep Kit) 17.5-3.13-1.6 GM/177ML solution oral solution Take 2 bottles by mouth Take As Directed. Do not eat the day before your procedure. If you didn't receive instructions call [phone]. 354 mL 0     vitamin D3 125 MCG (5000 UT) capsule capsule Take 1 capsule by mouth Daily.          Social History     Socioeconomic History    Marital status: Single   Tobacco Use    Smoking status: Every Day     Types: Cigarettes    Smokeless tobacco: Never   Vaping Use    Vaping status: Never Used   Substance and Sexual Activity    Alcohol use: Never    Drug use: Defer    Sexual activity: Defer       No family history on file.  Remarkable for atrial fibrillation in her mother    REVIEW OF SYSTEMS:     12 point ROS was performed and is Negative except as outlined in HPI     Objective:     Vitals:    07/05/25 0450 07/05/25 0451 07/05/25 0504 07/05/25 0539   BP:   151/88 147/81   Pulse: 72 75 74 74   Resp:   18 18   Temp:       TempSrc:       SpO2: 98% 97% 95% 98%   Weight:       Height:         Body mass index is 36.02 kg/m².  Flowsheet Rows      Flowsheet Row First Filed Value   Admission Height 170.2 cm (67\") Documented at 07/05/2025 0431   Admission Weight 104 kg (230 lb) Documented at 07/05/2025 0431            Physical Exam   General: No acute distress, well developed and well nourished.    Skin: Skin is warm and dry. No obvious cyanosis, erythema or pallor.   HEENT: Atraumatic, normocephalic, no conjunctival pallor, no scleral icterus.   Neck: Supple, no JVD. Normal carotid upstrokes, no bruits.    Chest:No respiratory distress. No chest wall tenderness. Breath " sounds are normal. No wheezes, rhonchi or rales.  Cardiovascular: Normal S1 and S2, no murmur, gallop or rub. PMI is not displaced.    Pulses:Radial and pedal pulses are 2+ and symmetric.    Abdomen: Soft, nontender, normal bowel sounds.   Musculoskeletal/Extremities:  No clubbing, cyanosis or edema. No gross deformity.   Neurological: Alert and oriented to person, place, and time, no gross focal deficits.   Psychiatric: Normal mood and affect.Speech and behavior is normal.    Lab Review:                Results from last 7 days   Lab Units 07/05/25  0441   SODIUM mmol/L 140   POTASSIUM mmol/L 4.3   CHLORIDE mmol/L 101   CO2 mmol/L 29.0   BUN mg/dL 16.3   CREATININE mg/dL 0.88   GLUCOSE mg/dL 273*   CALCIUM mg/dL 9.0     Results from last 7 days   Lab Units 07/05/25 0441   HSTROP T ng/L 41*     Results from last 7 days   Lab Units 07/05/25 0441   WBC 10*3/mm3 11.07*   HEMOGLOBIN g/dL 16.7*   HEMATOCRIT % 48.7*   PLATELETS 10*3/mm3 173     Results from last 7 days   Lab Units 07/05/25 0441   INR  0.95     Results from last 7 days   Lab Units 07/05/25 0441   MAGNESIUM mg/dL 1.7     Results from last 7 days   Lab Units 07/05/25 0441   CHOLESTEROL mg/dL 181   TRIGLYCERIDES mg/dL 267*   HDL CHOL mg/dL 27*   LDL CHOL mg/dL 108*     @LABRCNT(bnp)@  Imaging Results (Last 24 Hours)       Procedure Component Value Units Date/Time    XR Chest 1 View [[id number]] Collected: 07/05/25 0453     Updated: 07/05/25 0457    Narrative:      XR CHEST 1 VW    Date of Exam: 7/5/2025 4:38 AM EDT    Indication: Acute STEMI traige protocol    Comparison: 6/30/2024.    Findings:  There are no airspace consolidations. No pleural fluid. No pneumothorax. The pulmonary vasculature appears within normal limits. The cardiac and mediastinal silhouette appear unremarkable. No acute osseous abnormality identified.      Impression:      Impression:  No acute cardiopulmonary process      Electronically Signed: Jocelyn Rankin MD    7/5/2025 4:54 AM EDT     Workstation ID: [id number]             EKG: EMS EKG shows sinus rhythm with acute anterior ST elevation MI.  Repeat EKG in the ER shows sinus rhythm with nonspecific changes and resolution of ST segment elevations.     Assessment:   ACS/acute anterior ST elevation MI based on symptoms and field EKG, these have now resolved suggesting spontaneously aborted STEMI.  Hypertension.  Type 2 diabetes.  Dyslipidemia.  Chronic heavy smoker.  Obesity.    Plan:   Although her symptoms and EKG abnormalities have improved she definitely had ST elevation suggesting presence of unstable coronary disease.  We will give her heparin bolus in the ER, she already received aspirin.  Will proceed to cardiac cath for further evaluation of coronary anatomy, PCI if indicated.  The procedure was explained to the patient/family extensively. Indications, benefits, risks and alternatives were discussed. The patient understands well, and wishes to proceed.   Subsequently should be admitted to telemetry, we will address guideline directed medical management and risk factor modification.  We will involve hospitalist for medical management of multiple conditions.  Further management per clinical course.    John Scales MD, [facility], [facility] [place]        Electronically signed by John Scales MD at 25 0558          Physician Progress Notes (all)        Michelle Estevez APRN at 25 0716              [facility] Medicine Services  PROGRESS NOTE    Patient Name: Cori Moya  : 1960  MRN: [id number]    Date of Admission: 2025  Primary Care Physician: Pepper Delarosa APRN    Subjective   Subjective     CC:  Medical/ DM management    HPI: Chair when seen this morning.  Feeling very well.  Anxious to go home        Objective   Objective     Vital Signs:   Temp:  [97.9 °F (36.6 °C)-98.5 °F (36.9 °C)] 98.5 °F (36.9 °C)  Heart Rate:  [66-75] 66  Resp:  [18] 18  BP: (120-140)/(57-85) 126/57     Physical  Exam:  Constitutional: No acute distress, awake, alert  HENT: NCAT, mucous membranes moist  Respiratory: Clear to auscultation bilaterally, respiratory effort normal   Cardiovascular: RRR, no murmurs, rubs, or gallops  Gastrointestinal: Positive bowel sounds, soft, nontender, nondistended  Musculoskeletal: No bilateral ankle edema  Psychiatric: Appropriate affect, cooperative  Neurologic: Oriented x 3, strength symmetric in all extremities, Cranial Nerves grossly intact to confrontation, speech clear  Skin: No rashes  No change in exam from 7/6/2025      Results Reviewed:  LAB RESULTS:      Lab 07/05/25  1035 07/05/25  0651 07/05/25 0441   WBC  --  10.90* 11.07*   HEMOGLOBIN  --  15.6 16.7*   HEMATOCRIT  --  46.2 48.7*   PLATELETS  --  182 173   NEUTROS ABS  --   --  6.00   IMMATURE GRANS (ABS)  --   --  0.06*   LYMPHS ABS  --   --  3.71*   MONOS ABS  --   --  1.02*   EOS ABS  --   --  0.21   MCV  --  86.5 88.1   PROTIME  --   --  13.2   HEPARIN ANTI-XA  --   --  0.10*   HSTROP T 349* 186* 41*         Lab 07/07/25  0500 07/05/25  0651 07/05/25 0441   SODIUM 139 135* 140   POTASSIUM 4.4 4.2 4.3   CHLORIDE 106 100 101   CO2 22.7 25.0 29.0   ANION GAP 10.3 10.0 10.0   BUN 13.5 16.3 16.3   CREATININE 0.67 0.77 0.88   EGFR 96.5 85.2 72.6   GLUCOSE 177* 282* 273*   CALCIUM 8.5* 8.5* 9.0   MAGNESIUM  --   --  1.7   HEMOGLOBIN A1C  --  10.40*  --          Lab 07/05/25 0441   TOTAL PROTEIN 6.3   ALBUMIN 4.1   GLOBULIN 2.2   ALT (SGPT) 21   AST (SGOT) 19   BILIRUBIN 0.4   ALK PHOS 151*         Lab 07/05/25  1035 07/05/25  0651 07/05/25 0441   PROBNP  --   --  108.7   HSTROP T 349* 186* 41*   PROTIME  --   --  13.2   INR  --   --  0.95         Lab 07/05/25  0441   CHOLESTEROL 181   LDL CHOL 108*   HDL CHOL 27*   TRIGLYCERIDES 267*             Brief Urine Lab Results       None            Microbiology Results Abnormal       None            No radiology results from the last 24 hrs      Results for orders placed during the  hospital encounter of 07/05/25    Transthoracic Echo Complete With Contrast if Necessary Per Protocol 07/05/2025  1:03 PM    Interpretation Summary    Left ventricular systolic function is normal. Estimated left ventricular EF = 60%    Left ventricular wall thickness is consistent with mild concentric hypertrophy.    Left ventricular diastolic function was normal.    Trace mitral and tricuspid regurgitation.      Current medications:  Scheduled Meds:aspirin, 81 mg, Oral, Daily  carvedilol, 6.25 mg, Oral, BID With Meals  insulin glargine, 17 Units, Subcutaneous, Daily  Insulin Lispro, 10 Units, Subcutaneous, TID With Meals  insulin lispro, 3-14 Units, Subcutaneous, 4x Daily AC & at Bedtime  levothyroxine, 25 mcg, Oral, Q AM  losartan, 50 mg, Oral, Daily  montelukast, 10 mg, Oral, Daily  nicotine, 1 patch, Transdermal, Q24H  pharmacy consult - MTM, , Not Applicable, Daily  rosuvastatin, 20 mg, Oral, Nightly  ticagrelor, 90 mg, Oral, BID      Continuous Infusions:   PRN Meds:.  acetaminophen    ALPRAZolam    aluminum-magnesium hydroxide-simethicone    dextrose    dextrose    glucagon (human recombinant)    magnesium hydroxide    nicotine polacrilex    nitroglycerin    ondansetron ODT **OR** ondansetron    Sodium Chloride (PF)    Assessment & Plan   Assessment & Plan     Active Hospital Problems    Diagnosis  POA    **ACS (acute coronary syndrome) [I24.9]  Yes    Uncontrolled diabetes mellitus with hyperglycemia [E11.65]  Unknown    Tobacco abuse [Z72.0]  Unknown    Essential hypertension [I10]  Unknown      Resolved Hospital Problems   No resolved problems to display.        Brief Hospital Course to date:  Cori Moya is a 65 y.o. female  with pmh significant for HTN, arthritis, IIH, DM2 who presented last evening with acute onset crushing chest pain. She called EMS and found to have ST elevation in field, activating cath lab. She has undergone LHC with successful OCTAVIANO and now reporting feeling very well without CP,  soa, fatigue. Hospitalist consulted for uncontrolled DM.     ACS  -- trop -300  -- ST elevation in field per EMS, resolved on arrival  -- s/p heparin gtt  -- LHC 95% mid- distal ulcerated stenosis s/p OCTAVIANO reducing stenosis to 0%. Moderate nonobstructive disease of RCA and left circumflex. EF 60%  -- echo normal EF, mild LVH  -- DAPT, statin, carvedilol     HTN  -- losartan     Uncontrolled DM2  -- on farxiga 5mg, ozempic and metformin 500mg daily at home. Although has decreased ozempic dose x 1mo bc no refills  -- A1c 10.4%  -- holding home meds here, likely could use insulin but has been fairly noncompliant with DM medications in the recent past and states she does not want insulin at this time.  -- lantus, prandial and HSSI while inpatient  -- QID accuchecks  -- RX for DC: increase metformin ER 1000mg daily and farxiga 10mg daily. Continue ozempic- follow up with PCP this week as scheduled for increased dosing and refills/ alternate medication  -- stressed diet/ exercise modifications  -- DM educator c/s     Tobacco abuse  -- cessation advised  -- patch, declined        Thank you for allowing [facility] Medicine [facility] to provide consultative care for your patient, we will continue to follow while clinically appropriate.    Med rec addressed prior to discharge       Expected Discharge Location and Transportation: home per primary  Expected Discharge 7/7  Expected Discharge Date: 7/7/2025; Expected Discharge Time:      VTE Prophylaxis:  No VTE prophylaxis order currently exists.         AM-PAC 6 Clicks Score (PT): 24 (07/06/25 2000)    CODE STATUS:   Code Status and Medical Interventions: CPR (Attempt to Resuscitate); Full Support   Ordered at: 07/05/25 0634     Code Status (Patient has no pulse and is not breathing):    CPR (Attempt to Resuscitate)     Medical Interventions (Patient has pulse or is breathing):    Full Support       Michelle Estevez, JUNIOR  07/07/25        Electronically signed by  "Michelle Estevez, JUNIOR at 07/07/25 0952       John Scales MD at 07/07/25 0604          [facility]  IP Progress Note      Chief Complaint: Follow-up of acute MI/CAD    Subjective   Feels very well, has been ambulating without difficulty.  No chest pain or shortness of breath.  Wishes to go home.  States that she has not smoked since she came to the hospital and has not required any nicotine patch either.    Objective     Blood pressure 140/83, pulse 66, temperature 97.9 °F (36.6 °C), temperature source Oral, resp. rate 18, height 170.2 cm (67.01\"), weight 109 kg (240 lb 4.8 oz), SpO2 98%.     Intake/Output Summary (Last 24 hours) at 7/7/2025 0727  Last data filed at 7/6/2025 0930  Gross per 24 hour   Intake 690 ml   Output --   Net 690 ml       Physical Exam:  General: No acute distress.   Neck: no JVD.  Chest:No respiratory distress, breath sounds are normal. No wheezes,  rhonchi or rales.  Cardiovascular: Normal S1 and S2, no murmur, gallop or rub.    Extremities: No edema.  Right wrist stable, no bleeding or hematoma.    Results Review:     I reviewed the patient's new clinical results.    Results from last 7 days   Lab Units 07/05/25  0651   WBC 10*3/mm3 10.90*   HEMOGLOBIN g/dL 15.6   HEMATOCRIT % 46.2   PLATELETS 10*3/mm3 182     Results from last 7 days   Lab Units 07/07/25  0500 07/05/25  0651 07/05/25  0441   SODIUM mmol/L 139   < > 140   POTASSIUM mmol/L 4.4   < > 4.3   CHLORIDE mmol/L 106   < > 101   CO2 mmol/L 22.7   < > 29.0   BUN mg/dL 13.5   < > 16.3   CREATININE mg/dL 0.67   < > 0.88   CALCIUM mg/dL 8.5*   < > 9.0   BILIRUBIN mg/dL  --   --  0.4   ALK PHOS U/L  --   --  151*   ALT (SGPT) U/L  --   --  21   AST (SGOT) U/L  --   --  19   GLUCOSE mg/dL 177*   < > 273*    < > = values in this interval not displayed.     Results from last 7 days   Lab Units 07/07/25  0500   SODIUM mmol/L 139   POTASSIUM mmol/L 4.4   CHLORIDE mmol/L 106   CO2 mmol/L 22.7   BUN mg/dL " 13.5   CREATININE mg/dL 0.67   GLUCOSE mg/dL 177*   CALCIUM mg/dL 8.5*     Results from last 7 days   Lab Units 07/05/25  0441   INR  0.95     Lab Results   Component Value Date    TROPONINT 349 (C) 07/05/2025         Results from last 7 days   Lab Units 07/05/25  0441   CHOLESTEROL mg/dL 181   TRIGLYCERIDES mg/dL 267*   HDL CHOL mg/dL 27*   LDL CHOL mg/dL 108*     aspirin, 81 mg, Oral, Daily  carvedilol, 6.25 mg, Oral, BID With Meals  insulin glargine, 17 Units, Subcutaneous, Daily  Insulin Lispro, 10 Units, Subcutaneous, TID With Meals  insulin lispro, 3-14 Units, Subcutaneous, 4x Daily AC & at Bedtime  levothyroxine, 25 mcg, Oral, Q AM  losartan, 50 mg, Oral, Daily  montelukast, 10 mg, Oral, Daily  nicotine, 1 patch, Transdermal, Q24H  pharmacy consult - MTM, , Not Applicable, Daily  rosuvastatin, 20 mg, Oral, Nightly  ticagrelor, 90 mg, Oral, BID       Tele: Sinus Rythym      Assessment:  ACS/acute anterior MI.  CAD, status post LAD stent, residual moderate nonobstructive disease of the circumflex and the RCA.  EF 60%.  Hypertension.  Dyslipidemia.  Type 2 diabetes.  Uncontrolled, A1c 10.4 this admission.  Obesity.  Cigarette smoker.  Plan:  Continue current dual antiplatelet therapy.  Continue Crestor and current dose of losartan.  Increase carvedilol to 6.25 mg twice daily.  Management of diabetes per hospitalist.  Patient strongly encouraged to remain off cigarettes never smoke again.  She will be enrolled in cardiac rehab.  Overall condition is stable, we will discharge to home, follow-up with me in 6 weeks.    John Scales MD, [facility], [facility] [place]                                            Electronically signed by John Scales MD at 07/07/25 0747       Gabby Moody, JUNIOR at 07/06/25 0857       Attestation signed by Júnior Davison MD at 07/07/25 0908      I have reviewed this documentation and agree.                      [facility]  Hospital Progress Note     LOS: 1 day  "  Patient Care Team:  Pepper Delarosa APRN as PCP - General (Family Medicine)  PCP:  Pepper Delarosa APRN    Chief Complaint: STEMI    SUBJECTIVE: Up in chair. She would like to go home today. No chest pain or significant arrhythmias overnight.       OBJECTIVE:       Vital Sign Min/Max for last 24 hours  Temp  Min: 98 °F (36.7 °C)  Max: 98.4 °F (36.9 °C)   BP  Min: 103/83  Max: 152/89   Pulse  Min: 64  Max: 78   Resp  Min: 16  Max: 18   SpO2  Min: 95 %  Max: 97 %   No data recorded   Weight  Min: 109 kg (240 lb 4.8 oz)  Max: 109 kg (240 lb 4.8 oz)     Flowsheet Rows      Flowsheet Row First Filed Value   Admission Height 170.2 cm (67\") Documented at 07/05/2025 0431   Admission Weight 104 kg (230 lb) Documented at 07/05/2025 0431            Telemetry: SR      Intake/Output Summary (Last 24 hours) at 7/6/2025 0857  Last data filed at 7/5/2025 1758  Gross per 24 hour   Intake 480 ml   Output --   Net 480 ml     Intake & Output (last 3 days)         07/03 0701  07/04 0700 07/04 0701  07/05 0700 07/05 0701  07/06 0700 07/06 0701  07/07 0700    P.O.   720     I.V. (mL/kg)   500 (4.6)     Total Intake(mL/kg)   1220 (11.2)     Net   +1220             Urine Unmeasured Occurrence   2 x              Physical Exam:  Vitals reviewed.   Constitutional:       Appearance: Healthy appearance.   Neck:      Vascular: No JVD.   Pulmonary:      Effort: Pulmonary effort is normal.      Breath sounds: Normal breath sounds.   Cardiovascular:      Normal rate. Regular rhythm. Normal S1. Normal S2.       Murmurs: There is no murmur.      No gallop.  No rub.   Edema:     Peripheral edema absent.   Skin:     General: Skin is warm and dry.      Comments: Right radial dressing CDI, non tender, no drainage.   Neurological:      General: No focal deficit present.      Mental Status: Alert and oriented to person, place and time.          LABS/DIAGNOSTIC DATA:  Results from last 7 days   Lab Units 07/05/25  0651 07/05/25  0441 "   WBC 10*3/mm3 10.90* 11.07*   HEMOGLOBIN g/dL 15.6 16.7*   HEMATOCRIT % 46.2 48.7*   PLATELETS 10*3/mm3 182 173     Lab Results   Lab Value Date/Time    TROPONINT 349 (C) 07/05/2025 1035    TROPONINT 186 (C) 07/05/2025 0651    TROPONINT 41 (H) 07/05/2025 0441    TROPONINT 23 (H) 06/30/2024 1703    TROPONINT 23 (H) 06/30/2024 1446    TROPONINT 20 (H) 05/11/2023 1831    TROPONINT 17 (H) 05/11/2023 1700     Results from last 7 days   Lab Units 07/05/25  0441   INR  0.95     Results from last 7 days   Lab Units 07/05/25  0651 07/05/25  0441   SODIUM mmol/L 135* 140   POTASSIUM mmol/L 4.2 4.3   CHLORIDE mmol/L 100 101   CO2 mmol/L 25.0 29.0   BUN mg/dL 16.3 16.3   CREATININE mg/dL 0.77 0.88   CALCIUM mg/dL 8.5* 9.0   BILIRUBIN mg/dL  --  0.4   ALK PHOS U/L  --  151*   ALT (SGPT) U/L  --  21   AST (SGOT) U/L  --  19   GLUCOSE mg/dL 282* 273*     Results from last 7 days   Lab Units 07/05/25  0651   HEMOGLOBIN A1C % 10.40*     Results from last 7 days   Lab Units 07/05/25  0441   CHOLESTEROL mg/dL 181   TRIGLYCERIDES mg/dL 267*   HDL CHOL mg/dL 27*   LDL CHOL mg/dL 108*       Results for orders placed during the hospital encounter of 07/05/25  Cardiac Catheterization/Vascular Study  Impression  95% mid to distal ulcerated stenosis of the infarct-related left anterior descending coronary artery.  This vessel is now status post successful PTCA/placement of a 2.5 x 18 mm OCTAVIANO proximally optimized to 3.0 mm reducing the 95% stenosis to 0%  Residual moderate nonobstructive disease of the RCA and the left circumflex coronary artery.  Preserved left ventricular systolic function, estimated EF 60%.    RECOMMENDATIONS:  Dual antiplatelet therapy for 1 year.  Optimize medical therapy and risk factor management per guidelines.              Medication Review:   aspirin, 81 mg, Oral, Daily  carvedilol, 3.125 mg, Oral, BID With Meals  insulin glargine, 15 Units, Subcutaneous, Daily  insulin lispro, 2-9 Units, Subcutaneous, 4x Daily AC &  at Bedtime  Insulin Lispro, 3 Units, Subcutaneous, TID With Meals  levothyroxine, 25 mcg, Oral, Q AM  losartan, 50 mg, Oral, Daily  montelukast, 10 mg, Oral, Daily  nicotine, 1 patch, Transdermal, Q24H  pharmacy consult - MTM, , Not Applicable, Daily  rosuvastatin, 20 mg, Oral, Nightly  ticagrelor, 90 mg, Oral, BID               ASSESSMENT:  ACS/acute anterior ST elevation MI based on symptoms and field EKG, these have now resolved suggesting spontaneously aborted STEMI.  LHC: S/p PTCA/OCTAVIANO to LAD, moderate RCA stenosis, EF 60%  Echo: EF 60%, no significant valvular abnormalities  Hypertension.  Type 2 diabetes, A1C 10.4  Reports she has been out of Ozempic and Farxiga, has not followed up with PCP  Dyslipidemia,  (no statin PTA)  Chronic heavy smoker.  Obesity.      PLAN:  Dual antiplatelet for one year   Target BP < 130/80, on carvedilol, losartan  Target LDL < 55, rosuvastatin started  Target A1c < 7, on insulin  Smoking cessation  Monitor one more day, home tomorrow - she is adamant about going home today. Will discuss with MD. Reviewed high-risk for arrhythmias/complications post-MI.      JUNIOR Doan   25  08:57 EDT    Electronically signed by Júnior Davison MD at 25 0908       Michelle Estevez APRN at 25 0740              [facility] Medicine Services  PROGRESS NOTE    Patient Name: Cori Moya  : 1960  MRN: [id number]    Date of Admission: 2025  Primary Care Physician: Pepper Delarosa APRN    Subjective   Subjective     CC:  Medical/ DM management    HPI:  Patient up in chair eating lunch. Feels great still. No overnight issues      Objective   Objective     Vital Signs:   Temp:  [98 °F (36.7 °C)-98.4 °F (36.9 °C)] 98.4 °F (36.9 °C)  Heart Rate:  [64-78] 69  Resp:  [16-18] 18  BP: (103-155)/(64-89) 115/64     Physical Exam:  Constitutional: No acute distress, awake, alert  HENT: NCAT, mucous membranes moist  Respiratory:  Clear to auscultation bilaterally, respiratory effort normal   Cardiovascular: RRR, no murmurs, rubs, or gallops  Gastrointestinal: Positive bowel sounds, soft, nontender, nondistended  Musculoskeletal: No bilateral ankle edema  Psychiatric: Appropriate affect, cooperative  Neurologic: Oriented x 3, strength symmetric in all extremities, Cranial Nerves grossly intact to confrontation, speech clear  Skin: No rashes      Results Reviewed:  LAB RESULTS:      Lab 07/05/25  1035 07/05/25  0651 07/05/25 0441   WBC  --  10.90* 11.07*   HEMOGLOBIN  --  15.6 16.7*   HEMATOCRIT  --  46.2 48.7*   PLATELETS  --  182 173   NEUTROS ABS  --   --  6.00   IMMATURE GRANS (ABS)  --   --  0.06*   LYMPHS ABS  --   --  3.71*   MONOS ABS  --   --  1.02*   EOS ABS  --   --  0.21   MCV  --  86.5 88.1   PROTIME  --   --  13.2   HEPARIN ANTI-XA  --   --  0.10*   HSTROP T 349* 186* 41*         Lab 07/05/25  0651 07/05/25 0441   SODIUM 135* 140   POTASSIUM 4.2 4.3   CHLORIDE 100 101   CO2 25.0 29.0   ANION GAP 10.0 10.0   BUN 16.3 16.3   CREATININE 0.77 0.88   EGFR 85.2 72.6   GLUCOSE 282* 273*   CALCIUM 8.5* 9.0   MAGNESIUM  --  1.7   HEMOGLOBIN A1C 10.40*  --          Lab 07/05/25 0441   TOTAL PROTEIN 6.3   ALBUMIN 4.1   GLOBULIN 2.2   ALT (SGPT) 21   AST (SGOT) 19   BILIRUBIN 0.4   ALK PHOS 151*         Lab 07/05/25  1035 07/05/25  0651 07/05/25 0441   PROBNP  --   --  108.7   HSTROP T 349* 186* 41*   PROTIME  --   --  13.2   INR  --   --  0.95         Lab 07/05/25 0441   CHOLESTEROL 181   LDL CHOL 108*   HDL CHOL 27*   TRIGLYCERIDES 267*             Brief Urine Lab Results       None            Microbiology Results Abnormal       None            Cardiac Catheterization/Vascular Study  Result Date: 7/5/2025  FINAL     Impression: 95% mid to distal ulcerated stenosis of the infarct-related left anterior descending coronary artery. This vessel is now status post successful PTCA/placement of a 2.5 x 18 mm OCTAVIANO proximally optimized to 3.0 mm  reducing the 95% stenosis to 0% Residual moderate nonobstructive disease of the RCA and the left circumflex coronary artery. Preserved left ventricular systolic function, estimated EF 60%. RECOMMENDATIONS: Dual antiplatelet therapy for 1 year. Optimize medical therapy and risk factor management per guidelines. Indications: ACS with field STEMI activation. Access: Right radial. Procedures: Left heart catheterization. Left ventriculogram. Selective coronary angiography. PTCA/stenting of the LAD. Arterial site hemostasis with radial band. Procedure narrative: The patient was brought to the catheterization lab in a fasting condition.  Access site was prepped and draped in standard sterile fashion.  Lidocaine was injected and arterial access was obtained by percutaneous anterior wall puncture technique.  A 6 Maltese arterial sheath was placed. Above procedures were performed without complications. Following the angiograms ACT was monitored.  Cangrelor was started.  CLS 3.0 guide catheter was used to complete the angiography.  The patient was noted to have a 95% ulcerated stenosis of the mid to distal LAD in addition to nonocclusive proximal plaque.  The lesion was started with a versa turn wire and predilated with a 2.0 mm balloon and then stented with a 2.5 x 18 mm OCTAVIANO which was fully deployed and then postdilated proximally with a 3.0 mm NC balloon with satisfactory expansion and 0% residual stenosis and no evidence of complications.  Nitroglycerin was injected and final angiograms were taken.  MARTIN flow was grade 3 before and after PCI.  At the conclusion the arterial sheath was removed and hemostasis was achieved.  The patient was transferred to the unit in a stable condition. Hemodynamic Findings: Heart Rate: 72/minute. LV pressure: 140s/10-24 mmHg, on pull back no gradient was recorded across the aortic valve. Angiographic Findings: Right coronary dominance. LM: Short vessel which immediately bifurcates into the LAD  and the circumflex.  No significant disease is noted. LAD: Minor proximal irregularities and ectasia.  There is a 30 to 40% focal mid segment stenosis between the small first diagonal and larger second diagonal.  The first diagonal has 50% stenosis.  Immediately after the second diagonal the LAD has a focal 95% ulcerated appearing mid to distal stenosis.  This was the culprit lesion which was successfully stented with satisfactory results and no significant residual disease. LCX: Proximal ectasia.  The large first obtuse marginal is a tortuous vessel and has a 40% proximal stenosis.  The mid circumflex has 30% stenosis after bifurcation from the obtuse marginal.  Distally circumflex bifurcates into the second marginal and then the distal circumflex.  The second marginal has 30% plaque.  The distal circumflex has diffuse 50% stenosis before giving off 2 small lateral branches. RCA: Dominant vessel with diffuse irregular plaque with multiple segments of 30 to 50% narrowings over a long proximal to mid segment.  There is a 30% distal stenosis before the origin of the right PDA.  The right PDA has a 30 to 40% proximal stenosis. LV: Left ventriculogram performed in 30 IRVING projection revealed mild apical hypokinesis with preserved overall systolic function and estimated ejection fraction of 60%. No mitral regurgitation was noted. Complications: No acute procedure related complications.     XR Chest 1 View  Result Date: 7/5/2025  XR CHEST 1 VW Date of Exam: 7/5/2025 4:38 AM EDT Indication: Acute STEMI traige protocol Comparison: 6/30/2024. Findings: There are no airspace consolidations. No pleural fluid. No pneumothorax. The pulmonary vasculature appears within normal limits. The cardiac and mediastinal silhouette appear unremarkable. No acute osseous abnormality identified.     Impression: Impression: No acute cardiopulmonary process Electronically Signed: Jocelyn Rankin MD  7/5/2025 4:54 AM EDT  Workstation ID:  [id number]      Results for orders placed during the hospital encounter of 07/05/25    Transthoracic Echo Complete With Contrast if Necessary Per Protocol 07/05/2025  1:03 PM    Interpretation Summary    Left ventricular systolic function is normal. Estimated left ventricular EF = 60%    Left ventricular wall thickness is consistent with mild concentric hypertrophy.    Left ventricular diastolic function was normal.    Trace mitral and tricuspid regurgitation.      Current medications:  Scheduled Meds:aspirin, 81 mg, Oral, Daily  carvedilol, 3.125 mg, Oral, BID With Meals  insulin glargine, 15 Units, Subcutaneous, Daily  insulin lispro, 2-9 Units, Subcutaneous, 4x Daily AC & at Bedtime  Insulin Lispro, 3 Units, Subcutaneous, TID With Meals  levothyroxine, 25 mcg, Oral, Q AM  losartan, 50 mg, Oral, Daily  montelukast, 10 mg, Oral, Daily  nicotine, 1 patch, Transdermal, Q24H  pharmacy consult - MTM, , Not Applicable, Daily  rosuvastatin, 20 mg, Oral, Nightly  ticagrelor, 90 mg, Oral, BID      Continuous Infusions:   PRN Meds:.  acetaminophen    ALPRAZolam    aluminum-magnesium hydroxide-simethicone    dextrose    dextrose    glucagon (human recombinant)    magnesium hydroxide    nicotine polacrilex    nitroglycerin    ondansetron ODT **OR** ondansetron    Sodium Chloride (PF)    Assessment & Plan   Assessment & Plan     Active Hospital Problems    Diagnosis  POA    **ACS (acute coronary syndrome) [I24.9]  Yes    Uncontrolled diabetes mellitus with hyperglycemia [E11.65]  Unknown    Tobacco abuse [Z72.0]  Unknown    Essential hypertension [I10]  Unknown      Resolved Hospital Problems   No resolved problems to display.        Brief Hospital Course to date:  Coir Moya is a 65 y.o. female  with pmh significant for HTN, arthritis, IIH, DM2 who presented last evening with acute onset crushing chest pain. She called EMS and found to have ST elevation in field, activating cath lab. She has undergone LHC with successful  OCTAVIANO and now reporting feeling very well without CP, soa, fatigue. Hospitalist consulted for uncontrolled DM.     ACS  -- trop -300  -- ST elevation in field per EMS, resolved on arrival  -- s/p heparin gtt  -- LHC 95% mid- distal ulcerated stenosis s/p OCTAVIANO reducing stenosis to 0%. Moderate nonobstructive disease of RCA and left circumflex. EF 60%  -- echo normal EF, mild LVH  -- DAPT, statin, carvedilol     HTN  -- losartan     Uncontrolled DM2  -- on farxiga 5mg, ozempic and metformin 500mg daily at home. Although has decreased ozempic dose x 1mo bc no refills  -- A1c 10.4%  -- holding home meds here, likely could use insulin but has been fairly noncompliant with DM medications in the recent past and states she does not want insulin at this time.  -- lantus, prandial and MSSI while inpatient  -- QID accuchecks  -- RX for DC: increase metformin ER 1000mg daily; farxiga 10mg daily. Continue ozempic- follow up with PCP this week as scheduled for increased dosing and refills     Tobacco abuse  -- cessation advised  -- patch        Thank you for allowing [facility] Medicine Service to provide consultative care for your patient, we will continue to follow while clinically appropriate.       Expected Discharge Location and Transportation: home per primary  Expected Discharge 7/7  Expected discharge date/ time has not been documented.     VTE Prophylaxis:  No VTE prophylaxis order currently exists.         AM-PAC 6 Clicks Score (PT): 24 (07/05/25 1100)    CODE STATUS:   Code Status and Medical Interventions: CPR (Attempt to Resuscitate); Full Support   Ordered at: 07/05/25 0634     Code Status (Patient has no pulse and is not breathing):    CPR (Attempt to Resuscitate)     Medical Interventions (Patient has pulse or is breathing):    Full Support       JUNIOR Mckeon  07/06/25        Electronically signed by Michelle Estevez APRN at 07/06/25 2076

## 2025-07-07 NOTE — PROGRESS NOTES
[facility] Medicine Services  PROGRESS NOTE    Patient Name: Cori Moya  : 1960  MRN: [id number]    Date of Admission: 2025  Primary Care Physician: Pepper Delarosa APRN    Subjective   Subjective     CC:  Medical/ DM management    HPI: Chair when seen this morning.  Feeling very well.  Anxious to go home        Objective   Objective     Vital Signs:   Temp:  [97.9 °F (36.6 °C)-98.5 °F (36.9 °C)] 98.5 °F (36.9 °C)  Heart Rate:  [66-75] 66  Resp:  [18] 18  BP: (120-140)/(57-85) 126/57     Physical Exam:  Constitutional: No acute distress, awake, alert  HENT: NCAT, mucous membranes moist  Respiratory: Clear to auscultation bilaterally, respiratory effort normal   Cardiovascular: RRR, no murmurs, rubs, or gallops  Gastrointestinal: Positive bowel sounds, soft, nontender, nondistended  Musculoskeletal: No bilateral ankle edema  Psychiatric: Appropriate affect, cooperative  Neurologic: Oriented x 3, strength symmetric in all extremities, Cranial Nerves grossly intact to confrontation, speech clear  Skin: No rashes  No change in exam from 2025      Results Reviewed:  LAB RESULTS:      Lab 25  1035 25  0651 25  0441   WBC  --  10.90* 11.07*   HEMOGLOBIN  --  15.6 16.7*   HEMATOCRIT  --  46.2 48.7*   PLATELETS  --  182 173   NEUTROS ABS  --   --  6.00   IMMATURE GRANS (ABS)  --   --  0.06*   LYMPHS ABS  --   --  3.71*   MONOS ABS  --   --  1.02*   EOS ABS  --   --  0.21   MCV  --  86.5 88.1   PROTIME  --   --  13.2   HEPARIN ANTI-XA  --   --  0.10*   HSTROP T 349* 186* 41*         Lab 25  0500 25  0651 25  0441   SODIUM 139 135* 140   POTASSIUM 4.4 4.2 4.3   CHLORIDE 106 100 101   CO2 22.7 25.0 29.0   ANION GAP 10.3 10.0 10.0   BUN 13.5 16.3 16.3   CREATININE 0.67 0.77 0.88   EGFR 96.5 85.2 72.6   GLUCOSE 177* 282* 273*   CALCIUM 8.5* 8.5* 9.0   MAGNESIUM  --   --  1.7   HEMOGLOBIN A1C  --  10.40*  --          Lab 25  0440    TOTAL PROTEIN 6.3   ALBUMIN 4.1   GLOBULIN 2.2   ALT (SGPT) 21   AST (SGOT) 19   BILIRUBIN 0.4   ALK PHOS 151*         Lab 07/05/25  1035 07/05/25  0651 07/05/25  0441   PROBNP  --   --  108.7   HSTROP T 349* 186* 41*   PROTIME  --   --  13.2   INR  --   --  0.95         Lab 07/05/25  0441   CHOLESTEROL 181   LDL CHOL 108*   HDL CHOL 27*   TRIGLYCERIDES 267*             Brief Urine Lab Results       None            Microbiology Results Abnormal       None            No radiology results from the last 24 hrs      Results for orders placed during the hospital encounter of 07/05/25    Transthoracic Echo Complete With Contrast if Necessary Per Protocol 07/05/2025  1:03 PM    Interpretation Summary    Left ventricular systolic function is normal. Estimated left ventricular EF = 60%    Left ventricular wall thickness is consistent with mild concentric hypertrophy.    Left ventricular diastolic function was normal.    Trace mitral and tricuspid regurgitation.      Current medications:  Scheduled Meds:aspirin, 81 mg, Oral, Daily  carvedilol, 6.25 mg, Oral, BID With Meals  insulin glargine, 17 Units, Subcutaneous, Daily  Insulin Lispro, 10 Units, Subcutaneous, TID With Meals  insulin lispro, 3-14 Units, Subcutaneous, 4x Daily AC & at Bedtime  levothyroxine, 25 mcg, Oral, Q AM  losartan, 50 mg, Oral, Daily  montelukast, 10 mg, Oral, Daily  nicotine, 1 patch, Transdermal, Q24H  pharmacy consult - MTM, , Not Applicable, Daily  rosuvastatin, 20 mg, Oral, Nightly  ticagrelor, 90 mg, Oral, BID      Continuous Infusions:   PRN Meds:.  acetaminophen    ALPRAZolam    aluminum-magnesium hydroxide-simethicone    dextrose    dextrose    glucagon (human recombinant)    magnesium hydroxide    nicotine polacrilex    nitroglycerin    ondansetron ODT **OR** ondansetron    Sodium Chloride (PF)    Assessment & Plan   Assessment & Plan     Active Hospital Problems    Diagnosis  POA    **ACS (acute coronary syndrome) [I24.9]  Yes     Uncontrolled diabetes mellitus with hyperglycemia [E11.65]  Unknown    Tobacco abuse [Z72.0]  Unknown    Essential hypertension [I10]  Unknown      Resolved Hospital Problems   No resolved problems to display.        Brief Hospital Course to date:  Cori Moya is a 65 y.o. female  with pmh significant for HTN, arthritis, IIH, DM2 who presented last evening with acute onset crushing chest pain. She called EMS and found to have ST elevation in field, activating cath lab. She has undergone LHC with successful OCTAVIANO and now reporting feeling very well without CP, soa, fatigue. Hospitalist consulted for uncontrolled DM.     ACS  -- trop -300  -- ST elevation in field per EMS, resolved on arrival  -- s/p heparin gtt  -- LHC 95% mid- distal ulcerated stenosis s/p OCTAVIANO reducing stenosis to 0%. Moderate nonobstructive disease of RCA and left circumflex. EF 60%  -- echo normal EF, mild LVH  -- DAPT, statin, carvedilol     HTN  -- losartan     Uncontrolled DM2  -- on farxiga 5mg, ozempic and metformin 500mg daily at home. Although has decreased ozempic dose x 1mo bc no refills  -- A1c 10.4%  -- holding home meds here, likely could use insulin but has been fairly noncompliant with DM medications in the recent past and states she does not want insulin at this time.  -- lantus, prandial and HSSI while inpatient  -- QID accuchecks  -- RX for DC: increase metformin ER 1000mg daily and farxiga 10mg daily. Continue ozempic- follow up with PCP this week as scheduled for increased dosing and refills/ alternate medication  -- stressed diet/ exercise modifications  -- DM educator c/s     Tobacco abuse  -- cessation advised  -- patch, declined        Thank you for allowing [facility] Medicine Service to provide consultative care for your patient, we will continue to follow while clinically appropriate.    Med rec addressed prior to discharge       Expected Discharge Location and Transportation: home per primary  Expected  Discharge 7/7  Expected Discharge Date: 7/7/2025; Expected Discharge Time:      VTE Prophylaxis:  No VTE prophylaxis order currently exists.         AM-PAC 6 Clicks Score (PT): 24 (07/06/25 2000)    CODE STATUS:   Code Status and Medical Interventions: CPR (Attempt to Resuscitate); Full Support   Ordered at: 07/05/25 0634     Code Status (Patient has no pulse and is not breathing):    CPR (Attempt to Resuscitate)     Medical Interventions (Patient has pulse or is breathing):    Full Support       Michelle Estevez, APRN  07/07/25

## 2025-07-07 NOTE — NURSING NOTE
Pt. Referred for Phase II Cardiac Rehab. Staff discussed benefits of exercise, program protocol, and educational material provided. Teach back verified.    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  Staff gives her a brochure for [facility] and sends referral at patient's request.

## 2025-07-07 NOTE — CONSULTS
Diabetes Education    Patient Name:  Cori Moya  YOB: 1960  MRN: [id number]  Admit Date:  7/5/2025        Consult for diabetes education received per Blood Glucose >200. Chart reviewed. Pt was seen at bedside today. Permission given for visit.       Discussed and taught Ms. Moya about type 2 diabetes self-management, risk factors, and importance of blood glucose control to reduce complications. Target blood glucose readings and A1c goals per ADA were reviewed. Reviewed with patient current A1c 10.4 and discussed its significance.       Reviewed the following ADA survival skill concepts with pt:     Meal planning: Discussed pts current eating pattern and potential strategies to help improve it.  Suggested “the plate method” as a potential healthy eating plan and reviewed this with pt. Patient states that she eats small, very light meals, a protein shake for breakfast, a small snack for lunch and a small sized meal for dinner. Discussed importance of balancing proteins and carbs, eating regularly timed meals, adequate hydration, sang phenomena. Patient verbalized understanding    Safe medication administration: Reviewed pts current medication regimen. Pt states that she had run out of farxiga 1 week prior to hospitalization, had cut her ozempic dosage in half to make it last longer. Encouraged pt to take medications as prescribed and contact provider or pharmacist if they are experiencing side effects.  Discussed medications attending physician had prescribed including metformin, farxiga and ozempic. Patient verbalized understanding.    Monitoring (timing and technique): Encouraged pt to monitor blood sugar at home 4  times per day and to call PCP if blood sugar is trending high. Discussed benefits of SMBG/CGMS to help guide pt and provider decision making. Encouraged to keep record of blood glucose readings to take to follow up appointment with PCP. Patient states that she got overwhelmed with  "checking BG levels in addition to caring for someone with medical needs. Discussed CGM options and encouraged patient to consider speaking to her primary care provider about CGMs.     Prevention and treatment of hypoglycemia and hyperglycemia: Signs, symptoms, and treatment of hypoglycemia and hyperglycemia discussed with pt. Reviewed prevention strategies such as consistent eating pattern and taking medications as directed.  Pt is able to teach back appropriate treatment of hypoglycemia using the rule of 15s after receiving instruction.  Patient states that previously when her A1c was 6.8, she would have frequent lows. Discussed that adding medications and lifestyle changes can increase the risk for lows and to monitor frequently.    Sick day management: Reviewed general sick day guidelines with pt, including drinking plenty of water, keeping simple carbs handy such as jell-o or popsicles, checking blood glucose more often (every 2-4 hours or as directed by provider), and if/when ketone testing is appropriate. Encouraged pt to make a sick-day kit at home.      Physical activity: Reviewed benefits of physical activity for diabetes management and overall health. Encouraged pt to begin in 10 min increments to build up to recommended 150 minute per week after speaking with doctor about which activities may be right for them.      Follow-up care: Reviewed importance of ongoing follow-up with provider. Reviewed importance of annual physical exams, annual eye exams, regular dental exams, daily foot examination, and encouraged patient to discuss immunization needs with provider. Pt encouraged to attend scheduled appointments. Provided information about outpatient diabetes education classes at [facility].      Provided patient with copy of [facility]'s \"Life with Diabetes\" handout.       Thank you for this consult.      Total time spent reviewing chart, preparing education/materials, providing education at " bedside, and coordinating care approx 45 minutes.         Electronically signed by:  Christina Kapadia RN  07/07/25 11:47 EDT

## 2025-07-07 NOTE — PLAN OF CARE
Goal Outcome Evaluation:      VSS, no complaints of chest pain, ambulated in room and in hallway.  Patient will be discharged today with family.

## 2025-07-08 ENCOUNTER — READMISSION MANAGEMENT (OUTPATIENT)
Dept: CALL CENTER | Facility: HOSPITAL | Age: 65
End: 2025-07-08
Payer: MEDICARE

## 2025-07-08 NOTE — OUTREACH NOTE
Prep Survey      Flowsheet Row Responses   [facility] facility patient discharged from? [facility]   Is LACE score < 7 ? No   Eligibility Readm Mgmt   Discharge diagnosis ACS (acute coronary syndrome)/ STEMI, heart cath with stent   Does the patient have one of the following disease processes/diagnoses(primary or secondary)? Acute MI (STEMI,NSTEMI)   Does the patient have Home health ordered? No   Is there a DME ordered? No   Prep survey completed? Yes            Adelita Beard Registered Nurse

## 2025-07-10 NOTE — DISCHARGE SUMMARY
Date of Discharge:  7/10/2025    Discharge Diagnosis: ACS    Presenting Problem/History of Present Illness  ACS (acute coronary syndrome) [I24.9]    The patient is a 65-year-old female with past medical history of hypertension, diabetes and dyslipidemia.  She is a long-term smoker of more than 1 pack/day.  She has no prior cardiac history.  Patient states that she was in her usual state of health yesterday.  She was outdoors all day long and felt dehydrated towards the end of the evening.  States that she drank a lot of Gatorade.  At around 1030 last night she started experiencing bilateral arm discomfort and some jaw discomfort which was intermittent.  She was finally able to put herself to sleep despite the ongoing discomfort.  She awakened from sleep at around 3:30 AM this morning with severe crushing anterior chest pain with jaw pain and arm pain.  At this point she became concerned and called the ambulance.  Field EKG showed acute anterior ST elevations.  She was given nitroglycerin and aspirin and was brought to the emergency department.  Upon arrival here her symptoms had almost completely resolved and repeat EKG showed resolution of ST segment elevations.  At the time of my evaluation in the ER she is denying any current chest pain shortness of breath arm pain or jaw pain.  Review of systems is remarkable for chronic mild edema which improves with leg elevation.  She is denying any orthopnea or PND.  No palpitations dizziness lightheadedness or syncope.    Hospital Course  Patient is a 65 y.o. female presented as a field STEMI.  Her ST changes resolved and STEMI was aborted.  She was taken to the Cath Lab and LHC revealed 95% distal undid ulcerated stenosis of the infarct-related LAD which is now status post PTCA/OCTAVIANO reducing 95% stenosis to 0%.  There was residual nonobstructive disease of the RCA and circumflex.  Her ejection fraction was preserved at 60%.  She was admitted to telemetry for further  "evaluation and management.  She was started on GDMT and her medications were optimized.  Ultimately felt stable for discharge on 7/7/2025.    Procedures Performed  Procedure(s):  Left Heart Cath       Consults:   Consults       Date and Time Order Name Status Description    7/5/2025  6:34 AM Inpatient Hospitalist Consult Completed               Echo EF Estimated  Lab Results   Component Value Date    ECHOEFEST 60.0 07/05/2025       Condition on Discharge: Stable    Physical Exam at Discharge  Physical exam and vital signs copied and pasted from discharge day 7/7.  Physical exam performed by Dr. Scales.  Blood pressure 140/83, pulse 66, temperature 97.9 °F (36.6 °C), temperature source Oral, resp. rate 18, height 170.2 cm (67.01\"), weight 109 kg (240 lb 4.8 oz), SpO2 98%.      Intake/Output Summary (Last 24 hours) at 7/7/2025 0764  Last data filed at 7/6/2025 0930      Gross per 24 hour   Intake 690 ml   Output --   Net 690 ml         Physical Exam:  General: No acute distress.   Neck: no JVD.  Chest:No respiratory distress, breath sounds are normal. No wheezes,  rhonchi or rales.  Cardiovascular: Normal S1 and S2, no murmur, gallop or rub.    Extremities: No edema.  Right wrist stable, no bleeding or hematoma.  Discharge Disposition  Home or Self Care    Discharge Medications     Discharge Medications        New Medications        Instructions Start Date   aspirin 81 MG EC tablet   81 mg, Oral, Daily      carvedilol 6.25 MG tablet  Commonly known as: COREG   6.25 mg, Oral, 2 Times Daily With Meals      losartan 50 MG tablet  Commonly known as: COZAAR   50 mg, Oral, Nightly, (*Stop lisinopril*)      metFORMIN  MG 24 hr tablet  Commonly known as: GLUCOPHAGE-XR   1,000 mg, Oral, Daily With Breakfast      nitroglycerin 0.4 MG SL tablet  Commonly known as: NITROSTAT   Place 1 tablet under the tongue as needed for angina; may repeat dose every 5 minutes as needed for up to 3 doses at a time      Ozempic (0.25 or 0.5 " MG/DOSE) 2 MG/3ML solution pen-injector  Generic drug: Semaglutide(0.25 or 0.5MG/DOS)   0.5 mg, Subcutaneous, Weekly      rosuvastatin 20 MG tablet  Commonly known as: CRESTOR   20 mg, Oral, Nightly      ticagrelor 90 MG tablet tablet  Commonly known as: BRILINTA   90 mg, Oral, 2 Times Daily             Changes to Medications        Instructions Start Date   Farxiga 10 MG tablet  Generic drug: dapagliflozin Propanediol  What changed:   medication strength  how much to take  when to take this   Take 1 tablet by mouth Daily.             Continue These Medications        Instructions Start Date   levothyroxine 25 MCG tablet  Commonly known as: SYNTHROID, LEVOTHROID   TAKE ONE TABLET BY MOUTH EVERY DAY IN THE MORNING ON AN EMPTY STOMACH WITH COOL WATER. EAT ONE HOUR LATER      Singulair 10 MG tablet  Generic drug: montelukast   1 tablet      vitamin D3 125 MCG (5000 UT) capsule capsule   1 capsule, Daily             Stop These Medications      ketorolac 10 MG tablet  Commonly known as: TORADOL     lisinopril 20 MG tablet  Commonly known as: PRINIVIL,ZESTRIL              Discharge Diet: Cardiac    Activity at Discharge: As tolerated    Follow-up Appointments  Future Appointments   Date Time Provider Department Center   8/22/2025 10:15 AM Gin Gibbs PA-C [facility]     Additional Instructions for the Follow-ups that You Need to Schedule       Discharge Follow-up with Specialty: Dr Scales; 6 Weeks   As directed      Specialty: Dr Scales   Follow Up: 6 Weeks                Test Results Pending at Discharge none       Gin Gibbs PA-C  07/10/25  12:32 EDT

## 2025-07-11 LAB — ACT BLD: 245 SECONDS (ref 82–152)

## 2025-07-18 ENCOUNTER — READMISSION MANAGEMENT (OUTPATIENT)
Dept: CALL CENTER | Facility: HOSPITAL | Age: 65
End: 2025-07-18
Payer: MEDICARE

## 2025-07-18 NOTE — OUTREACH NOTE
AMI Week 1 Survey      Flowsheet Row Responses   [facility] patient discharged from? [facility]   Does the patient have one of the following disease processes/diagnoses(primary or secondary)? Acute MI (STEMI,NSTEMI)   Week 1 attempt successful? No   Unsuccessful attempts Attempt 1   Discharge diagnosis ACS (acute coronary syndrome)/ STEMI, heart cath with stent            AVTAR STRINGER - Registered Nurse

## 2025-07-25 ENCOUNTER — READMISSION MANAGEMENT (OUTPATIENT)
Dept: CALL CENTER | Facility: HOSPITAL | Age: 65
End: 2025-07-25
Payer: MEDICARE

## 2025-07-25 NOTE — OUTREACH NOTE
AMI Week 1 Survey      Flowsheet Row Responses   [facility] patient discharged from? [place]   Does the patient have one of the following disease processes/diagnoses(primary or secondary)? Acute MI (STEMI,NSTEMI)   Week 1 attempt successful? Yes   Call start time 1529   Call end time 1536   Discharge diagnosis ACS (acute coronary syndrome)/ STEMI, heart cath with stent   Person spoke with today (if not patient) and relationship Patient   Meds reviewed with patient/caregiver? Yes   Is the patient having any side effects they believe may be caused by any medication additions or changes? No   Does the patient have all prescriptions related to this admission filled (includes statins,anticoagulants,HTN meds,anti-arrhythmia meds) Yes   Is the patient taking all medications as directed (includes completed medication regime)? Yes   Does the patient have a primary care provider?  Yes   Does the patient have an appointment with their PCP,cardiologist,or clinic within 7 days of discharge? Yes   Has the patient kept scheduled appointments due by today? Yes   Psychosocial issues? No   Did the patient receive a copy of their discharge instructions? Yes   Nursing interventions Reviewed instructions with patient   What is the patient's perception of their health status since discharge? Improving   Nursing interventions Nurse provided patient education   Is the patient/caregiver able to teach back signs and symptoms of when to call for help immediately: Sudden chest discomfort, Sudden discomfort in arms, back, neck or jaw, Shortness of breath at any time, Sudden sweating or clammy skin, Nausea or vomiting, Dizziness or lightheadedness, Irregular or rapid heart rate   Is the patient/caregiver able to teach back ways to prevent a second heart attack: Take medications, Follow up with MD   Is the patient/caregiver able to teach back the hierarchy of who to call/visit for symptoms/problems? PCP, Specialist, Home health nurse,  Urgent Care, ED, 911 Yes   Week 1 call completed? Yes   Revoked No further contact(revokes)-requires comment   Is the patient interested in additional calls from an ambulatory ? No   Would this patient benefit from a Referral to [facility] Social Work? No   Wrap up additional comments Patient is doing much better, still fatigued, but overall doing better.   Call end time 2036            Jordan BUSTOS - Registered Nurse

## 2025-08-13 PROBLEM — E78.5 DYSLIPIDEMIA: Status: ACTIVE | Noted: 2025-08-13

## 2025-08-22 ENCOUNTER — OFFICE VISIT (OUTPATIENT)
Dept: CARDIOLOGY | Facility: CLINIC | Age: 65
End: 2025-08-22
Payer: MEDICARE

## 2025-08-22 ENCOUNTER — PATIENT ROUNDING (BHMG ONLY) (OUTPATIENT)
Dept: CARDIOLOGY | Facility: CLINIC | Age: 65
End: 2025-08-22
Payer: MEDICARE

## 2025-08-22 VITALS
OXYGEN SATURATION: 95 % | SYSTOLIC BLOOD PRESSURE: 112 MMHG | HEIGHT: 67 IN | WEIGHT: 232.2 LBS | HEART RATE: 72 BPM | DIASTOLIC BLOOD PRESSURE: 60 MMHG | BODY MASS INDEX: 36.44 KG/M2

## 2025-08-22 DIAGNOSIS — E11.65 UNCONTROLLED TYPE 2 DIABETES MELLITUS WITH HYPERGLYCEMIA: ICD-10-CM

## 2025-08-22 DIAGNOSIS — Z72.0 TOBACCO ABUSE: ICD-10-CM

## 2025-08-22 DIAGNOSIS — I24.9 ACS (ACUTE CORONARY SYNDROME): Primary | ICD-10-CM

## 2025-08-22 DIAGNOSIS — E78.5 DYSLIPIDEMIA: ICD-10-CM

## 2025-08-22 DIAGNOSIS — I10 ESSENTIAL HYPERTENSION: ICD-10-CM

## 2025-08-22 RX ORDER — TRAMADOL HYDROCHLORIDE 50 MG/1
50 TABLET ORAL AS NEEDED
COMMUNITY
Start: 2025-07-31

## 2025-08-22 RX ORDER — LORATADINE 10 MG/1
10 TABLET ORAL DAILY
COMMUNITY

## 2025-08-22 RX ORDER — CYCLOBENZAPRINE HCL 10 MG
10 TABLET ORAL AS NEEDED
COMMUNITY
Start: 2025-07-11

## (undated) DEVICE — DEV INFL MONARCH 25W

## (undated) DEVICE — PK CATH CARD 10

## (undated) DEVICE — NC TREK NEO™ CORONARY DILATATION CATHETER 3.00 MM X 12 MM / RAPID-EXCHANGE: Brand: NC TREK NEO™

## (undated) DEVICE — MINI TREK CORONARY DILATATION CATHETER 2.0 MM X 12 MM / RAPID-EXCHANGE: Brand: MINI TREK

## (undated) DEVICE — ST INF PRI SMRTSTE 20DRP 2VLV 24ML 117

## (undated) DEVICE — ST EXT IV SMRTSTE 2VLV FIX M LL 6ML 41

## (undated) DEVICE — INF 5F 0.038 INCHES 100CM JL3: Brand: INFINITI

## (undated) DEVICE — GUIDE CATHETER: Brand: MACH1™

## (undated) DEVICE — INTRO SHEATH PRELUDE IDEAL SPRNG COIL 021 6F 23X80CM

## (undated) DEVICE — MODEL BT2000 P/N 700287-012KIT CONTENTS: MANIFOLD WITH SALINE AND CONTRAST PORTS, SALINE TUBING WITH SPIKE AND HAND SYRINGE, TRANSDUCER: Brand: BT2000 AUTOMATED MANIFOLD KIT

## (undated) DEVICE — GW INQWIRE FC PTFE STD J/1.5 .035 260

## (undated) DEVICE — CATH DIAG EXPO M/ PK 5F FL4/FR4 PIG

## (undated) DEVICE — TR BAND RADIAL ARTERY COMPRESSION DEVICE: Brand: TR BAND

## (undated) DEVICE — MODEL AT P65, P/N 701554-001KIT CONTENTS: HAND CONTROLLER, 3-WAY HIGH-PRESSURE STOPCOCK WITH ROTATING END AND PREMIUM HIGH-PRESSURE TUBING: Brand: ANGIOTOUCH® KIT

## (undated) DEVICE — HI-TORQUE VERSATURN F GUIDE WIRE FULLY COATED .014 STRAIGHT TIP 190 CM: Brand: HI-TORQUE VERSATURN